# Patient Record
Sex: FEMALE | Race: WHITE | NOT HISPANIC OR LATINO | Employment: UNEMPLOYED | URBAN - METROPOLITAN AREA
[De-identification: names, ages, dates, MRNs, and addresses within clinical notes are randomized per-mention and may not be internally consistent; named-entity substitution may affect disease eponyms.]

---

## 2019-10-10 ENCOUNTER — OFFICE VISIT (OUTPATIENT)
Dept: URGENT CARE | Facility: CLINIC | Age: 54
End: 2019-10-10
Payer: MEDICARE

## 2019-10-10 VITALS
BODY MASS INDEX: 32.96 KG/M2 | SYSTOLIC BLOOD PRESSURE: 134 MMHG | DIASTOLIC BLOOD PRESSURE: 60 MMHG | HEART RATE: 90 BPM | WEIGHT: 186 LBS | TEMPERATURE: 98.4 F | HEIGHT: 63 IN | RESPIRATION RATE: 18 BRPM | OXYGEN SATURATION: 97 %

## 2019-10-10 DIAGNOSIS — R30.0 DYSURIA: ICD-10-CM

## 2019-10-10 DIAGNOSIS — N30.01 ACUTE CYSTITIS WITH HEMATURIA: ICD-10-CM

## 2019-10-10 DIAGNOSIS — N12 PYELONEPHRITIS: Primary | ICD-10-CM

## 2019-10-10 LAB
SL AMB  POCT GLUCOSE, UA: ABNORMAL
SL AMB LEUKOCYTE ESTERASE,UA: ABNORMAL
SL AMB POCT BILIRUBIN,UA: ABNORMAL
SL AMB POCT BLOOD,UA: ABNORMAL
SL AMB POCT CLARITY,UA: ABNORMAL
SL AMB POCT COLOR,UA: YELLOW
SL AMB POCT KETONES,UA: ABNORMAL
SL AMB POCT NITRITE,UA: ABNORMAL
SL AMB POCT PH,UA: 5
SL AMB POCT SPECIFIC GRAVITY,UA: 1.03
SL AMB POCT URINE PROTEIN: ABNORMAL
SL AMB POCT UROBILINOGEN: 0.2

## 2019-10-10 PROCEDURE — G0463 HOSPITAL OUTPT CLINIC VISIT: HCPCS | Performed by: FAMILY MEDICINE

## 2019-10-10 PROCEDURE — 99204 OFFICE O/P NEW MOD 45 MIN: CPT | Performed by: FAMILY MEDICINE

## 2019-10-10 PROCEDURE — 81002 URINALYSIS NONAUTO W/O SCOPE: CPT | Performed by: FAMILY MEDICINE

## 2019-10-10 PROCEDURE — 87086 URINE CULTURE/COLONY COUNT: CPT | Performed by: FAMILY MEDICINE

## 2019-10-10 RX ORDER — OXYCODONE HCL 10 MG/1
10 TABLET, FILM COATED, EXTENDED RELEASE ORAL EVERY 12 HOURS SCHEDULED
COMMUNITY
End: 2021-04-08

## 2019-10-10 RX ORDER — HYDROMORPHONE HYDROCHLORIDE 2 MG/1
2 TABLET ORAL EVERY 4 HOURS PRN
COMMUNITY
End: 2021-04-08

## 2019-10-10 RX ORDER — CIPROFLOXACIN 500 MG/1
500 TABLET, FILM COATED ORAL EVERY 12 HOURS SCHEDULED
Qty: 14 TABLET | Refills: 0 | Status: SHIPPED | OUTPATIENT
Start: 2019-10-10 | End: 2019-10-17

## 2019-10-10 NOTE — PROGRESS NOTES
NAME: Jenney Cowden is a 53 y o  female  : 1965    MRN: [id number]      Assessment and Plan   Pyelonephritis [N12]  1  Pyelonephritis     2  Acute cystitis with hematuria  POCT urine dip    ciprofloxacin (CIPRO) 500 mg tablet    Urine culture   3  Dysuria  POCT urine dip           Patient Instructions   Patient Instructions   F/u as needed  Cipro  Fluids  CX sent    Proceed to ER if symptoms worsen  Chief Complaint     Chief Complaint   Patient presents with    Possible UTI     freq  urination and burning on urination for 2 weeks         History of Present Illness   Here c/o UTI  Burning, L lower back pain, frequency, urgency,  Symptoms for about 2 wks  No fevers  chills      Review of Systems   Review of Systems   Constitutional: Negative for fatigue and fever  HENT: Negative for ear pain and trouble swallowing  Eyes: Negative for visual disturbance  Respiratory: Negative for chest tightness and shortness of breath  Cardiovascular: Negative for chest pain  Gastrointestinal: Negative for abdominal pain, diarrhea, nausea and vomiting  Genitourinary: Positive for dysuria, frequency and urgency  Negative for difficulty urinating  Musculoskeletal: Positive for back pain  Skin: Negative for rash and wound  Neurological: Negative for weakness and headaches  Psychiatric/Behavioral: Negative for behavioral problems and confusion           Current Medications       Current Outpatient Medications:     Galcanezumab-gnlm (EMGALITY) 120 MG/ML SOAJ, Inject under the skin, Disp: , Rfl:     HYDROmorphone (DILAUDID) 2 mg tablet, Take 2 mg by mouth every 4 (four) hours as needed for moderate pain, Disp: , Rfl:     oxyCODONE (OxyCONTIN) 10 mg 12 hr tablet, Take 10 mg by mouth every 12 (twelve) hours, Disp: , Rfl:     ciprofloxacin (CIPRO) 500 mg tablet, Take 1 tablet (500 mg total) by mouth every 12 (twelve) hours for 7 days, Disp: 14 tablet, Rfl: 0    Current Allergies     Allergies as of 10/10/2019 - Reviewed 10/10/2019   Allergen Reaction Noted    Demerol [meperidine] Hallucinations 10/10/2019              History reviewed  No pertinent past medical history  History reviewed  No pertinent surgical history  History reviewed  No pertinent family history  Medications have been verified  The following portions of the patient's history were reviewed and updated as appropriate: allergies, current medications, past family history, past medical history, past social history, past surgical history and problem list     Objective   /60   Pulse 90   Temp 98 4 °F (36 9 °C)   Resp 18   Ht 5' 3" (1 6 m)   Wt 84 4 kg (186 lb)   SpO2 97%   BMI 32 95 kg/m²      Physical Exam     Physical Exam   Constitutional: She is oriented to person, place, and time  She appears well-developed and well-nourished  HENT:   Head: Normocephalic  Eyes: EOM are normal    Neck: Normal range of motion  Cardiovascular: Normal rate, regular rhythm and normal heart sounds  Exam reveals no gallop and no friction rub  No murmur heard  Pulmonary/Chest: Effort normal and breath sounds normal  No respiratory distress  She has no wheezes  She has no rales  Abdominal: Soft  Bowel sounds are normal  She exhibits no distension  There is tenderness  There is no rebound  Musculoskeletal: Normal range of motion  R CVAT   Neurological: She is oriented to person, place, and time  Skin: Skin is warm and dry  No rash noted  Psychiatric: She has a normal mood and affect  Thought content normal    Nursing note and vitals reviewed

## 2019-10-11 LAB — BACTERIA UR CULT: NORMAL

## 2019-10-14 ENCOUNTER — TELEPHONE (OUTPATIENT)
Dept: URGENT CARE | Facility: CLINIC | Age: 54
End: 2019-10-14

## 2021-04-08 ENCOUNTER — LAB (OUTPATIENT)
Dept: LAB | Facility: HOSPITAL | Age: 56
End: 2021-04-08
Attending: INTERNAL MEDICINE
Payer: MEDICARE

## 2021-04-08 ENCOUNTER — OFFICE VISIT (OUTPATIENT)
Dept: FAMILY MEDICINE CLINIC | Facility: HOSPITAL | Age: 56
End: 2021-04-08
Payer: MEDICARE

## 2021-04-08 VITALS
BODY MASS INDEX: 32.5 KG/M2 | HEART RATE: 86 BPM | HEIGHT: 63 IN | OXYGEN SATURATION: 95 % | DIASTOLIC BLOOD PRESSURE: 80 MMHG | SYSTOLIC BLOOD PRESSURE: 102 MMHG | WEIGHT: 183.4 LBS

## 2021-04-08 DIAGNOSIS — Z12.31 SCREENING MAMMOGRAM, ENCOUNTER FOR: ICD-10-CM

## 2021-04-08 DIAGNOSIS — Z13.29 SCREENING FOR THYROID DISORDER: ICD-10-CM

## 2021-04-08 DIAGNOSIS — Z01.818 PRE-OP EXAMINATION: ICD-10-CM

## 2021-04-08 DIAGNOSIS — Z13.1 SCREENING FOR DIABETES MELLITUS: ICD-10-CM

## 2021-04-08 DIAGNOSIS — Z13.0 SCREENING, ANEMIA, DEFICIENCY, IRON: ICD-10-CM

## 2021-04-08 DIAGNOSIS — Z13.220 SCREENING, LIPID: ICD-10-CM

## 2021-04-08 DIAGNOSIS — F11.20 CHRONIC NARCOTIC DEPENDENCE (HCC): ICD-10-CM

## 2021-04-08 DIAGNOSIS — G90.59 COMPLEX REGIONAL PAIN SYNDROME TYPE 1 AFFECTING OTHER SITE: ICD-10-CM

## 2021-04-08 DIAGNOSIS — Z00.00 HEALTH CARE MAINTENANCE: Primary | ICD-10-CM

## 2021-04-08 PROBLEM — G90.50 COMPLEX REGIONAL PAIN SYNDROME TYPE I: Status: ACTIVE | Noted: 2021-04-08

## 2021-04-08 LAB
ALBUMIN SERPL BCP-MCNC: 3.9 G/DL (ref 3.5–5)
ALP SERPL-CCNC: 59 U/L (ref 46–116)
ALT SERPL W P-5'-P-CCNC: 38 U/L (ref 12–78)
ANION GAP SERPL CALCULATED.3IONS-SCNC: 3 MMOL/L (ref 4–13)
AST SERPL W P-5'-P-CCNC: 44 U/L (ref 5–45)
BASOPHILS # BLD AUTO: 0.01 THOUSANDS/ΜL (ref 0–0.1)
BASOPHILS NFR BLD AUTO: 0 % (ref 0–1)
BILIRUB SERPL-MCNC: 0.44 MG/DL (ref 0.2–1)
BUN SERPL-MCNC: 10 MG/DL (ref 5–25)
CALCIUM SERPL-MCNC: 9.3 MG/DL (ref 8.3–10.1)
CHLORIDE SERPL-SCNC: 109 MMOL/L (ref 100–108)
CHOLEST SERPL-MCNC: 163 MG/DL (ref 50–200)
CO2 SERPL-SCNC: 29 MMOL/L (ref 21–32)
CREAT SERPL-MCNC: 0.8 MG/DL (ref 0.6–1.3)
EOSINOPHIL # BLD AUTO: 0.22 THOUSAND/ΜL (ref 0–0.61)
EOSINOPHIL NFR BLD AUTO: 4 % (ref 0–6)
ERYTHROCYTE [DISTWIDTH] IN BLOOD BY AUTOMATED COUNT: 11.5 % (ref 11.6–15.1)
GFR SERPL CREATININE-BSD FRML MDRD: 82 ML/MIN/1.73SQ M
GLUCOSE P FAST SERPL-MCNC: 99 MG/DL (ref 65–99)
HCT VFR BLD AUTO: 41.1 % (ref 34.8–46.1)
HDLC SERPL-MCNC: 58 MG/DL
HGB BLD-MCNC: 13.9 G/DL (ref 11.5–15.4)
IMM GRANULOCYTES # BLD AUTO: 0.01 THOUSAND/UL (ref 0–0.2)
IMM GRANULOCYTES NFR BLD AUTO: 0 % (ref 0–2)
LDLC SERPL CALC-MCNC: 95 MG/DL (ref 0–100)
LYMPHOCYTES # BLD AUTO: 0.89 THOUSANDS/ΜL (ref 0.6–4.47)
LYMPHOCYTES NFR BLD AUTO: 17 % (ref 14–44)
MCH RBC QN AUTO: 31.8 PG (ref 26.8–34.3)
MCHC RBC AUTO-ENTMCNC: 33.8 G/DL (ref 31.4–37.4)
MCV RBC AUTO: 94 FL (ref 82–98)
MONOCYTES # BLD AUTO: 0.26 THOUSAND/ΜL (ref 0.17–1.22)
MONOCYTES NFR BLD AUTO: 5 % (ref 4–12)
NEUTROPHILS # BLD AUTO: 3.94 THOUSANDS/ΜL (ref 1.85–7.62)
NEUTS SEG NFR BLD AUTO: 74 % (ref 43–75)
NRBC BLD AUTO-RTO: 0 /100 WBCS
PLATELET # BLD AUTO: 216 THOUSANDS/UL (ref 149–390)
PMV BLD AUTO: 10.4 FL (ref 8.9–12.7)
POTASSIUM SERPL-SCNC: 4.2 MMOL/L (ref 3.5–5.3)
PROT SERPL-MCNC: 7.6 G/DL (ref 6.4–8.2)
RBC # BLD AUTO: 4.37 MILLION/UL (ref 3.81–5.12)
SODIUM SERPL-SCNC: 141 MMOL/L (ref 136–145)
TRIGL SERPL-MCNC: 52 MG/DL
TSH SERPL DL<=0.05 MIU/L-ACNC: 0.82 UIU/ML (ref 0.36–3.74)
WBC # BLD AUTO: 5.33 THOUSAND/UL (ref 4.31–10.16)

## 2021-04-08 PROCEDURE — 85025 COMPLETE CBC W/AUTO DIFF WBC: CPT

## 2021-04-08 PROCEDURE — 84443 ASSAY THYROID STIM HORMONE: CPT

## 2021-04-08 PROCEDURE — 80053 COMPREHEN METABOLIC PANEL: CPT

## 2021-04-08 PROCEDURE — 36415 COLL VENOUS BLD VENIPUNCTURE: CPT

## 2021-04-08 PROCEDURE — 80061 LIPID PANEL: CPT

## 2021-04-08 PROCEDURE — 99204 OFFICE O/P NEW MOD 45 MIN: CPT | Performed by: INTERNAL MEDICINE

## 2021-04-08 RX ORDER — ONDANSETRON HYDROCHLORIDE 24 MG/1
4 TABLET, FILM COATED ORAL
COMMUNITY
End: 2021-12-06 | Stop reason: ALTCHOICE

## 2021-04-08 RX ORDER — FENTANYL 100 UG/H
PATCH TRANSDERMAL
COMMUNITY
Start: 2021-04-07

## 2021-04-08 RX ORDER — FENTANYL 50 UG/H
PATCH TRANSDERMAL
COMMUNITY
Start: 2021-04-07

## 2021-04-08 RX ORDER — HYDROMORPHONE HYDROCHLORIDE 8 MG/1
TABLET ORAL
COMMUNITY
Start: 2021-04-07

## 2021-04-08 NOTE — PROGRESS NOTES
Subjective:   Chief Complaint   Patient presents with    Well Check     new patient     Pre-op Exam     4/21/2021 Dr Jocy Mckeon        Patient ID: Guillermo Dykes is a 61 y o  female  Here to establish care and is planning foot surgery 4/21 and will need clearance for this  Has chronic pain from CRPS diagnosis and is on chronic narcotic medications which are managed by her pain specialist      Has no other significant medical problems  The following portions of the patient's history were reviewed and updated as appropriate: allergies, current medications, past family history, past medical history, past social history, past surgical history and problem list     Review of Systems   Constitutional: Negative for fatigue and fever  HENT: Negative for hearing loss  Eyes: Negative for visual disturbance  Respiratory: Negative for cough, chest tightness, shortness of breath and wheezing  Cardiovascular: Negative for chest pain, palpitations and leg swelling  Gastrointestinal: Negative for abdominal pain, diarrhea and nausea  Genitourinary: Negative for dysuria and hematuria  Musculoskeletal: Positive for arthralgias, back pain and myalgias  Neurological: Negative for dizziness, numbness and headaches  Psychiatric/Behavioral: Negative for confusion and dysphoric mood  All other systems reviewed and are negative          Current Outpatient Medications on File Prior to Visit   Medication Sig Dispense Refill    fentaNYL (DURAGESIC) 100 mcg/hr TD 72 hr patch apply 1 patch to skin every 48 hours Transdermal 30 days fill on/after 4/7/21      fentaNYL (DURAGESIC) 50 mcg/hr apply 1 patch to skin every 48 hours Transdermal 30 days fill on/after 4/7/21      HYDROmorphone (DILAUDID) 8 MG tablet take 1 tablet as needed every 6 hrs Orally 30 days fill on/after 4/7/21      ondansetron (ZOFRAN) 24 MG tablet Take 4 mg by mouth      Galcanezumab-gnlm (EMGALITY) 120 MG/ML SOAJ Inject under the skin       No current facility-administered medications on file prior to visit  Objective:  Vitals:    04/08/21 1021   BP: 102/80   Pulse: 86   SpO2: 95%   Weight: 83 2 kg (183 lb 6 4 oz)   Height: 5' 3" (1 6 m)      Physical Exam  Vitals signs and nursing note reviewed  Constitutional:       General: She is not in acute distress  Neck:      Musculoskeletal: Normal range of motion  Cardiovascular:      Rate and Rhythm: Normal rate and regular rhythm  Pulmonary:      Effort: Pulmonary effort is normal       Breath sounds: Normal breath sounds  Musculoskeletal: Normal range of motion  Skin:     Findings: No rash  Neurological:      Mental Status: She is alert and oriented to person, place, and time  Psychiatric:         Thought Content: Thought content normal            Assessment/Plan:    Pre-op evaluation  EKG, Normal, sinus, no acute findings  Labs,  BS 99, CBC normal, GFR  82  Patient is cleared for the planned surgical procedure  Diagnoses and all orders for this visit:    Health care maintenance    Pre-op examination  -     POCT ECG    Complex regional pain syndrome type 1 affecting other site    Screening mammogram, encounter for  -     Mammo screening bilateral w 3d & cad; Future    Screening, anemia, deficiency, iron  -     CBC and differential; Future    Screening for thyroid disorder  -     TSH, 3rd generation; Future    Screening, lipid  -     Lipid Panel with Direct LDL reflex; Future    Screening for diabetes mellitus  -     Comprehensive metabolic panel; Future    Chronic narcotic dependence ([facility] [address] )    Other orders  -     fentaNYL (DURAGESIC) 100 mcg/hr TD 72 hr patch; apply 1 patch to skin every 48 hours Transdermal 30 days fill on/after 4/7/21  -     fentaNYL (DURAGESIC) 50 mcg/hr; apply 1 patch to skin every 48 hours Transdermal 30 days fill on/after 4/7/21  -     ondansetron (ZOFRAN) 24 MG tablet;  Take 4 mg by mouth  -     HYDROmorphone (DILAUDID) 8 MG tablet; take 1 tablet as needed every 6 hrs Orally 30 days fill on/after 4/7/21

## 2021-04-08 NOTE — PATIENT INSTRUCTIONS
Your visit today was to establish a relationship with our office and to establish care  We hope that you feel welcomed and confident that we will address your health care needs as needed  The doctor or nurse reviewed your health information with you, all medications and some family and social history  If the doctor has ordered labs or tests, please do this soon and then schedule a follow up visit as requested  Your visit today was for pre-operative clearance  The purpose is to be sure that there are no significant medical issues which would increase the risk of the surgery  The doctor will review pre-op labs tests as well as your medications and other medical history  Once the doctor is reassured that there is nothing of concern to address, she will inform the surgeon that you are cleared to have this surgery  Please be sure you follow up with any additional tests that might have been ordered, and make any medication adjustments that were discussed
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
## 2021-04-12 LAB — # OF QRS COMPLEXES: NORMAL MS

## 2021-04-12 PROCEDURE — 93000 ELECTROCARDIOGRAM COMPLETE: CPT | Performed by: INTERNAL MEDICINE

## 2021-04-22 ENCOUNTER — DOCUMENTATION (OUTPATIENT)
Dept: FAMILY MEDICINE CLINIC | Facility: HOSPITAL | Age: 56
End: 2021-04-22

## 2021-10-19 ENCOUNTER — OFFICE VISIT (OUTPATIENT)
Dept: FAMILY MEDICINE CLINIC | Facility: HOSPITAL | Age: 56
End: 2021-10-19
Payer: MEDICARE

## 2021-10-19 VITALS
WEIGHT: 180.6 LBS | HEIGHT: 63 IN | BODY MASS INDEX: 32 KG/M2 | SYSTOLIC BLOOD PRESSURE: 110 MMHG | DIASTOLIC BLOOD PRESSURE: 80 MMHG | HEART RATE: 97 BPM

## 2021-10-19 DIAGNOSIS — M79.642 CHRONIC HAND PAIN, LEFT: Primary | ICD-10-CM

## 2021-10-19 DIAGNOSIS — Z82.61 FAMILY HISTORY OF RHEUMATOID ARTHRITIS: ICD-10-CM

## 2021-10-19 DIAGNOSIS — M79.641 CHRONIC HAND PAIN, RIGHT: ICD-10-CM

## 2021-10-19 DIAGNOSIS — G89.29 CHRONIC HAND PAIN, LEFT: Primary | ICD-10-CM

## 2021-10-19 DIAGNOSIS — Z12.31 ENCOUNTER FOR SCREENING MAMMOGRAM FOR BREAST CANCER: ICD-10-CM

## 2021-10-19 DIAGNOSIS — G89.29 CHRONIC HAND PAIN, RIGHT: ICD-10-CM

## 2021-10-19 DIAGNOSIS — N39.41 URGE INCONTINENCE OF URINE: ICD-10-CM

## 2021-10-19 PROCEDURE — 99214 OFFICE O/P EST MOD 30 MIN: CPT | Performed by: STUDENT IN AN ORGANIZED HEALTH CARE EDUCATION/TRAINING PROGRAM

## 2021-10-22 ENCOUNTER — APPOINTMENT (OUTPATIENT)
Dept: LAB | Facility: HOSPITAL | Age: 56
End: 2021-10-22
Attending: STUDENT IN AN ORGANIZED HEALTH CARE EDUCATION/TRAINING PROGRAM
Payer: MEDICARE

## 2021-10-22 ENCOUNTER — HOSPITAL ENCOUNTER (OUTPATIENT)
Dept: RADIOLOGY | Facility: HOSPITAL | Age: 56
Discharge: HOME | End: 2021-10-22
Attending: STUDENT IN AN ORGANIZED HEALTH CARE EDUCATION/TRAINING PROGRAM
Payer: MEDICARE

## 2021-10-22 DIAGNOSIS — M79.642 CHRONIC HAND PAIN, LEFT: ICD-10-CM

## 2021-10-22 DIAGNOSIS — G89.29 CHRONIC HAND PAIN, RIGHT: ICD-10-CM

## 2021-10-22 DIAGNOSIS — Z82.61 FAMILY HISTORY OF RHEUMATOID ARTHRITIS: ICD-10-CM

## 2021-10-22 DIAGNOSIS — M79.641 CHRONIC HAND PAIN, RIGHT: ICD-10-CM

## 2021-10-22 DIAGNOSIS — G89.29 CHRONIC HAND PAIN, LEFT: ICD-10-CM

## 2021-10-22 DIAGNOSIS — R82.998 URINE LEUKOCYTES: Primary | ICD-10-CM

## 2021-10-22 LAB
BACTERIA UR QL AUTO: ABNORMAL /HPF
BILIRUB UR QL STRIP: NEGATIVE
CLARITY UR: ABNORMAL
COLOR UR: YELLOW
CRP SERPL QL: <3 MG/L
GLUCOSE UR STRIP-MCNC: NEGATIVE MG/DL
HGB UR QL STRIP.AUTO: ABNORMAL
HYALINE CASTS #/AREA URNS LPF: ABNORMAL /LPF
KETONES UR STRIP-MCNC: NEGATIVE MG/DL
LEUKOCYTE ESTERASE UR QL STRIP: ABNORMAL
NITRITE UR QL STRIP: POSITIVE
NON-SQ EPI CELLS URNS QL MICRO: ABNORMAL /HPF
PH UR STRIP.AUTO: 7 [PH]
PROT UR STRIP-MCNC: NEGATIVE MG/DL
RBC #/AREA URNS AUTO: ABNORMAL /HPF
SP GR UR STRIP.AUTO: 1.02 (ref 1–1.03)
UROBILINOGEN UR QL STRIP.AUTO: 0.2 E.U./DL
WBC #/AREA URNS AUTO: ABNORMAL /HPF

## 2021-10-22 PROCEDURE — 86430 RHEUMATOID FACTOR TEST QUAL: CPT

## 2021-10-22 PROCEDURE — 81001 URINALYSIS AUTO W/SCOPE: CPT | Performed by: STUDENT IN AN ORGANIZED HEALTH CARE EDUCATION/TRAINING PROGRAM

## 2021-10-22 PROCEDURE — 36415 COLL VENOUS BLD VENIPUNCTURE: CPT

## 2021-10-22 PROCEDURE — 86200 CCP ANTIBODY: CPT

## 2021-10-22 PROCEDURE — 73130 X-RAY EXAM OF HAND: CPT

## 2021-10-22 PROCEDURE — 86140 C-REACTIVE PROTEIN: CPT

## 2021-10-22 PROCEDURE — 86038 ANTINUCLEAR ANTIBODIES: CPT

## 2021-10-22 RX ORDER — SULFAMETHOXAZOLE AND TRIMETHOPRIM 800; 160 MG/1; MG/1
1 TABLET ORAL 2 TIMES DAILY
Qty: 6 TABLET | Refills: 0 | Status: SHIPPED | OUTPATIENT
Start: 2021-10-22 | End: 2021-10-25

## 2021-10-23 LAB — RHEUMATOID FACT SER QL LA: NEGATIVE

## 2021-10-25 LAB — RYE IGE QN: NEGATIVE

## 2021-10-26 LAB — CCP AB SER IA-ACNC: 2.3

## 2021-12-06 ENCOUNTER — OFFICE VISIT (OUTPATIENT)
Dept: FAMILY MEDICINE CLINIC | Facility: HOSPITAL | Age: 56
End: 2021-12-06
Payer: COMMERCIAL

## 2021-12-06 VITALS
HEIGHT: 63 IN | OXYGEN SATURATION: 99 % | SYSTOLIC BLOOD PRESSURE: 122 MMHG | DIASTOLIC BLOOD PRESSURE: 84 MMHG | RESPIRATION RATE: 16 BRPM | HEART RATE: 86 BPM | BODY MASS INDEX: 31.18 KG/M2 | WEIGHT: 176 LBS

## 2021-12-06 DIAGNOSIS — Z12.11 SCREEN FOR COLON CANCER: ICD-10-CM

## 2021-12-06 DIAGNOSIS — R25.1 SHAKINESS: ICD-10-CM

## 2021-12-06 DIAGNOSIS — Z79.899 MEDICAL MARIJUANA USE: ICD-10-CM

## 2021-12-06 DIAGNOSIS — Z79.891 CHRONIC PRESCRIPTION OPIATE USE: ICD-10-CM

## 2021-12-06 DIAGNOSIS — N39.41 URGE INCONTINENCE OF URINE: ICD-10-CM

## 2021-12-06 DIAGNOSIS — N39.0 RECURRENT UTI: ICD-10-CM

## 2021-12-06 DIAGNOSIS — Z00.00 HEALTH CARE MAINTENANCE: Primary | ICD-10-CM

## 2021-12-06 DIAGNOSIS — R82.998 URINE LEUKOCYTES: ICD-10-CM

## 2021-12-06 DIAGNOSIS — G90.59 COMPLEX REGIONAL PAIN SYNDROME TYPE 1 AFFECTING OTHER SITE: ICD-10-CM

## 2021-12-06 PROBLEM — Z01.818 PRE-OP EXAMINATION: Status: RESOLVED | Noted: 2021-04-08 | Resolved: 2021-12-06

## 2021-12-06 LAB
SL AMB  POCT GLUCOSE, UA: ABNORMAL
SL AMB LEUKOCYTE ESTERASE,UA: ABNORMAL
SL AMB POCT BILIRUBIN,UA: ABNORMAL
SL AMB POCT BLOOD,UA: ABNORMAL
SL AMB POCT CLARITY,UA: YELLOW
SL AMB POCT COLOR,UA: ABNORMAL
SL AMB POCT HEMOGLOBIN AIC: 5.4 (ref ?–6.5)
SL AMB POCT KETONES,UA: ABNORMAL
SL AMB POCT NITRITE,UA: ABNORMAL
SL AMB POCT PH,UA: 7.5
SL AMB POCT SPECIFIC GRAVITY,UA: 1.01
SL AMB POCT URINE PROTEIN: ABNORMAL
SL AMB POCT UROBILINOGEN: ABNORMAL

## 2021-12-06 PROCEDURE — 3725F SCREEN DEPRESSION PERFORMED: CPT | Performed by: STUDENT IN AN ORGANIZED HEALTH CARE EDUCATION/TRAINING PROGRAM

## 2021-12-06 PROCEDURE — G0439 PPPS, SUBSEQ VISIT: HCPCS | Performed by: STUDENT IN AN ORGANIZED HEALTH CARE EDUCATION/TRAINING PROGRAM

## 2021-12-06 PROCEDURE — 83036 HEMOGLOBIN GLYCOSYLATED A1C: CPT | Performed by: STUDENT IN AN ORGANIZED HEALTH CARE EDUCATION/TRAINING PROGRAM

## 2021-12-06 PROCEDURE — 87086 URINE CULTURE/COLONY COUNT: CPT | Performed by: STUDENT IN AN ORGANIZED HEALTH CARE EDUCATION/TRAINING PROGRAM

## 2021-12-06 PROCEDURE — 87077 CULTURE AEROBIC IDENTIFY: CPT | Performed by: STUDENT IN AN ORGANIZED HEALTH CARE EDUCATION/TRAINING PROGRAM

## 2021-12-06 PROCEDURE — 3008F BODY MASS INDEX DOCD: CPT | Performed by: STUDENT IN AN ORGANIZED HEALTH CARE EDUCATION/TRAINING PROGRAM

## 2021-12-06 PROCEDURE — 87186 SC STD MICRODIL/AGAR DIL: CPT | Performed by: STUDENT IN AN ORGANIZED HEALTH CARE EDUCATION/TRAINING PROGRAM

## 2021-12-06 PROCEDURE — 81002 URINALYSIS NONAUTO W/O SCOPE: CPT | Performed by: STUDENT IN AN ORGANIZED HEALTH CARE EDUCATION/TRAINING PROGRAM

## 2021-12-06 PROCEDURE — 1036F TOBACCO NON-USER: CPT | Performed by: STUDENT IN AN ORGANIZED HEALTH CARE EDUCATION/TRAINING PROGRAM

## 2021-12-06 RX ORDER — ONDANSETRON 8 MG/1
TABLET, ORALLY DISINTEGRATING ORAL
COMMUNITY

## 2021-12-06 RX ORDER — SULFAMETHOXAZOLE AND TRIMETHOPRIM 800; 160 MG/1; MG/1
1 TABLET ORAL 2 TIMES DAILY
Qty: 6 TABLET | Refills: 0 | Status: SHIPPED | OUTPATIENT
Start: 2021-12-06 | End: 2021-12-09

## 2021-12-08 LAB
BACTERIA UR CULT: ABNORMAL
BACTERIA UR CULT: ABNORMAL

## 2021-12-09 DIAGNOSIS — N39.0 RECURRENT UTI: Primary | ICD-10-CM

## 2021-12-09 RX ORDER — CIPROFLOXACIN 500 MG/1
500 TABLET, FILM COATED ORAL EVERY 12 HOURS SCHEDULED
Qty: 14 TABLET | Refills: 0 | Status: SHIPPED | OUTPATIENT
Start: 2021-12-09 | End: 2021-12-16

## 2021-12-10 ENCOUNTER — TELEPHONE (OUTPATIENT)
Dept: FAMILY MEDICINE CLINIC | Facility: HOSPITAL | Age: 56
End: 2021-12-10

## 2021-12-10 DIAGNOSIS — B34.9 VIRAL ILLNESS: Primary | ICD-10-CM

## 2021-12-10 PROCEDURE — U0003 INFECTIOUS AGENT DETECTION BY NUCLEIC ACID (DNA OR RNA); SEVERE ACUTE RESPIRATORY SYNDROME CORONAVIRUS 2 (SARS-COV-2) (CORONAVIRUS DISEASE [COVID-19]), AMPLIFIED PROBE TECHNIQUE, MAKING USE OF HIGH THROUGHPUT TECHNOLOGIES AS DESCRIBED BY CMS-2020-01-R: HCPCS | Performed by: STUDENT IN AN ORGANIZED HEALTH CARE EDUCATION/TRAINING PROGRAM

## 2021-12-10 PROCEDURE — U0005 INFEC AGEN DETEC AMPLI PROBE: HCPCS | Performed by: STUDENT IN AN ORGANIZED HEALTH CARE EDUCATION/TRAINING PROGRAM

## 2021-12-22 ENCOUNTER — ANNUAL EXAM (OUTPATIENT)
Dept: FAMILY MEDICINE CLINIC | Facility: HOSPITAL | Age: 56
End: 2021-12-22
Payer: COMMERCIAL

## 2021-12-22 VITALS
BODY MASS INDEX: 31 KG/M2 | DIASTOLIC BLOOD PRESSURE: 84 MMHG | HEART RATE: 75 BPM | SYSTOLIC BLOOD PRESSURE: 118 MMHG | OXYGEN SATURATION: 96 % | WEIGHT: 175 LBS

## 2021-12-22 DIAGNOSIS — Z12.4 CERVICAL CANCER SCREENING: Primary | ICD-10-CM

## 2021-12-22 DIAGNOSIS — Z11.51 SCREENING FOR HPV (HUMAN PAPILLOMAVIRUS): ICD-10-CM

## 2021-12-22 PROCEDURE — G0101 CA SCREEN;PELVIC/BREAST EXAM: HCPCS | Performed by: STUDENT IN AN ORGANIZED HEALTH CARE EDUCATION/TRAINING PROGRAM

## 2021-12-22 PROCEDURE — G0476 HPV COMBO ASSAY CA SCREEN: HCPCS | Performed by: STUDENT IN AN ORGANIZED HEALTH CARE EDUCATION/TRAINING PROGRAM

## 2021-12-22 PROCEDURE — G0145 SCR C/V CYTO,THINLAYER,RESCR: HCPCS | Performed by: STUDENT IN AN ORGANIZED HEALTH CARE EDUCATION/TRAINING PROGRAM

## 2021-12-22 RX ORDER — NALOXEGOL OXALATE 25 MG/1
TABLET, FILM COATED ORAL
COMMUNITY
Start: 2021-12-15

## 2021-12-24 LAB
HPV HR 12 DNA CVX QL NAA+PROBE: NEGATIVE
HPV16 DNA CVX QL NAA+PROBE: NEGATIVE
HPV18 DNA CVX QL NAA+PROBE: NEGATIVE

## 2022-01-06 LAB
LAB AP GYN PRIMARY INTERPRETATION: NORMAL
Lab: NORMAL

## 2022-02-13 ENCOUNTER — HOSPITAL ENCOUNTER (OUTPATIENT)
Dept: MRI IMAGING | Facility: HOSPITAL | Age: 57
Discharge: HOME | End: 2022-02-13
Payer: COMMERCIAL

## 2022-02-13 DIAGNOSIS — M54.16 LUMBAR RADICULOPATHY: ICD-10-CM

## 2022-02-13 PROCEDURE — 72148 MRI LUMBAR SPINE W/O DYE: CPT

## 2022-02-13 PROCEDURE — G1004 CDSM NDSC: HCPCS

## 2022-10-12 PROBLEM — Z00.00 HEALTH CARE MAINTENANCE: Status: RESOLVED | Noted: 2021-04-08 | Resolved: 2022-10-12

## 2023-08-18 ENCOUNTER — OFFICE VISIT (OUTPATIENT)
Dept: URGENT CARE | Facility: CLINIC | Age: 58
End: 2023-08-18
Payer: COMMERCIAL

## 2023-08-18 VITALS
HEART RATE: 74 BPM | DIASTOLIC BLOOD PRESSURE: 64 MMHG | OXYGEN SATURATION: 96 % | SYSTOLIC BLOOD PRESSURE: 126 MMHG | RESPIRATION RATE: 18 BRPM | TEMPERATURE: 98.2 F
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
## 2023-08-18 DIAGNOSIS — N30.01 ACUTE CYSTITIS WITH HEMATURIA: Primary | ICD-10-CM

## 2023-08-18 LAB
SL AMB  POCT GLUCOSE, UA: ABNORMAL
SL AMB LEUKOCYTE ESTERASE,UA: POSITIVE
SL AMB POCT BILIRUBIN,UA: ABNORMAL
SL AMB POCT BLOOD,UA: POSITIVE
SL AMB POCT CLARITY,UA: ABNORMAL
SL AMB POCT COLOR,UA: YELLOW
SL AMB POCT KETONES,UA: ABNORMAL
SL AMB POCT NITRITE,UA: POSITIVE
SL AMB POCT PH,UA: 7
SL AMB POCT SPECIFIC GRAVITY,UA: 1.01
SL AMB POCT URINE PROTEIN: ABNORMAL
SL AMB POCT UROBILINOGEN: ABNORMAL

## 2023-08-18 PROCEDURE — 99213 OFFICE O/P EST LOW 20 MIN: CPT | Performed by: NURSE PRACTITIONER

## 2023-08-18 PROCEDURE — 87077 CULTURE AEROBIC IDENTIFY: CPT | Performed by: NURSE PRACTITIONER

## 2023-08-18 PROCEDURE — 81002 URINALYSIS NONAUTO W/O SCOPE: CPT | Performed by: NURSE PRACTITIONER

## 2023-08-18 PROCEDURE — S9083 URGENT CARE CENTER GLOBAL: HCPCS | Performed by: NURSE PRACTITIONER

## 2023-08-18 PROCEDURE — 87186 SC STD MICRODIL/AGAR DIL: CPT | Performed by: NURSE PRACTITIONER

## 2023-08-18 PROCEDURE — 87086 URINE CULTURE/COLONY COUNT: CPT | Performed by: NURSE PRACTITIONER

## 2023-08-18 RX ORDER — SULFAMETHOXAZOLE AND TRIMETHOPRIM 800; 160 MG/1; MG/1
1 TABLET ORAL 2 TIMES DAILY
Qty: 6 TABLET | Refills: 0 | Status: SHIPPED | OUTPATIENT
Start: 2023-08-18 | End: 2023-08-21

## 2023-08-18 RX ORDER — HYDROXYZINE HYDROCHLORIDE 25 MG/1
25 TABLET, FILM COATED ORAL EVERY 6 HOURS PRN
Qty: 15 TABLET | Refills: 0 | Status: SHIPPED | OUTPATIENT
Start: 2023-08-18 | End: 2023-09-02

## 2023-08-18 NOTE — PROGRESS NOTES
[facility]        NAME: Radha Odonnell is a 60 y.o. female  : 1965    MRN: [id number]  DATE: 2023  TIME: 7:42 PM    Assessment and Plan   Acute cystitis with hematuria [N30.01]  1. Acute cystitis with hematuria  sulfamethoxazole-trimethoprim (BACTRIM DS) 800-160 mg per tablet    POCT urine dip    Urine culture      2. Bug bite, initial encounter  hydrOXYzine HCL (ATARAX) 25 mg tablet        Acute symptomatic POCT urine dip showed positive for leuks hematuria and nitrates. Will recommend increase fluids and start Bactrim twice daily x3 days. We will send urine culture however it may not be enough urine. Educated patient if urine culture can be run and symptoms persist should follow-up with urgent care primary care or ED. For bug bite will recommend hydroxyzine every 6 hours as needed for itching and swelling. Educated on side effects proper use of medication. Patient verbalized understanding and will follow-up with any worsening of symptoms    Patient Instructions       Follow up with PCP in 3-5 days. Proceed to  ER if symptoms worsen. Chief Complaint     Chief Complaint   Patient presents with   • Possible UTI     Patient has frequent urination, burning, and odor for about a week or more. • Rash     Patient was bit on the right side of her face by a bug of some sort about 90 minutes ago, has itching and edema         History of Present Illness       Patient is a 28-year-old female arrives with complaints of dysuria urine frequent urination and odor with urination x1 month. Denies nausea vomiting lower abdominal pain. Does report slight low back pain. Denies past medical history of kidney stones kidney infection. Also reports bug bite happened about 90 minutes ago to the right side of the face now started with some swelling.   No signs or symptoms of infection noted no shortness of breath chest pain fever      Review of Systems   Review of Systems   Constitutional: Negative for activity change, appetite change, chills, fatigue and fever. HENT: Negative for congestion, postnasal drip, rhinorrhea, sneezing and sore throat. Respiratory: Negative for cough, chest tightness, shortness of breath and wheezing. Cardiovascular: Negative for chest pain and palpitations. Gastrointestinal: Negative for abdominal pain, constipation, diarrhea, nausea and vomiting. Genitourinary: Positive for dysuria, frequency and hematuria. Negative for flank pain. Musculoskeletal: Negative for arthralgias and myalgias. Skin: Positive for rash. Negative for color change and pallor. Neurological: Negative for dizziness, weakness, light-headedness and headaches. Hematological: Negative for adenopathy. Psychiatric/Behavioral: Negative for agitation and confusion.          Current Medications       Current Outpatient Medications:   •  hydrOXYzine HCL (ATARAX) 25 mg tablet, Take 1 tablet (25 mg total) by mouth every 6 (six) hours as needed for itching for up to 15 days, Disp: 15 tablet, Rfl: 0  •  sulfamethoxazole-trimethoprim (BACTRIM DS) 800-160 mg per tablet, Take 1 tablet by mouth 2 (two) times a day for 3 days, Disp: 6 tablet, Rfl: 0  •  fentaNYL (DURAGESIC) 100 mcg/hr TD 72 hr patch, apply 1 patch to skin every 48 hours Transdermal 30 days fill on/after 4/7/21, Disp: , Rfl:   •  fentaNYL (DURAGESIC) 50 mcg/hr, apply 1 patch to skin every 48 hours Transdermal 30 days fill on/after 4/7/21, Disp: , Rfl:   •  HYDROmorphone (DILAUDID) 8 MG tablet, take 1 tablet as needed every 6 hrs Orally 30 days fill on/after 4/7/21, Disp: , Rfl:   •  Movantik 25 MG tablet, , Disp: , Rfl:   •  ondansetron (ZOFRAN-ODT) 8 mg disintegrating tablet, 1 every 8 hours as needed, Disp: , Rfl:     Current Allergies     Allergies as of 08/18/2023 - Reviewed 08/18/2023   Allergen Reaction Noted   • Demerol [meperidine] Hallucinations 10/10/2019   • Latex Rash 03/08/2016            The following portions of the patient's history were reviewed and updated as appropriate: allergies, current medications, past family history, past medical history, past social history, past surgical history and problem list.     Past Medical History:   Diagnosis Date   • CRPS (complex regional pain syndrome type II)        Past Surgical History:   Procedure Laterality Date   • CERVICAL FUSION      level 5 6   • FOOT SURGERY     • HERNIA REPAIR         Family History   Problem Relation Age of Onset   • Lung cancer Mother    • Stroke Father    • Mental illness Father    • Kidney cancer Sister    • Mental illness Sister    • Alcohol abuse Brother    • Rheum arthritis Maternal Grandmother    • Breast cancer Sister    • Cirrhosis Sister          Medications have been verified. Objective   /64   Pulse 74   Temp 98.2 °F (36.8 °C)   Resp 18   SpO2 96%   No LMP recorded. Physical Exam     Physical Exam  Vitals and nursing note reviewed. Constitutional:       General: She is not in acute distress. Appearance: Normal appearance. She is not ill-appearing or diaphoretic. HENT:      Head: Normocephalic and atraumatic. Nose: No congestion or rhinorrhea. Eyes:      General: No scleral icterus. Right eye: No discharge. Left eye: No discharge. Cardiovascular:      Rate and Rhythm: Normal rate and regular rhythm. Pulmonary:      Effort: Pulmonary effort is normal. No respiratory distress. Breath sounds: Normal breath sounds. Abdominal:      Tenderness: There is no abdominal tenderness. There is no right CVA tenderness or left CVA tenderness. Musculoskeletal:         General: Normal range of motion. Cervical back: Normal range of motion. Skin:     Coloration: Skin is not jaundiced or pale. Findings: Rash present. No bruising or erythema. Neurological:      General: No focal deficit present. Mental Status: She is alert and oriented to person, place, and time.    Psychiatric: Mood and Affect: Mood normal.         Behavior: Behavior normal.         Thought Content:  Thought content normal.         Judgment: Judgment normal.

## 2023-08-20 LAB — BACTERIA UR CULT: ABNORMAL

## 2023-08-22 ENCOUNTER — TELEPHONE (OUTPATIENT)
Dept: URGENT CARE | Facility: CLINIC | Age: 58
End: 2023-08-22

## 2023-08-22 DIAGNOSIS — R31.9 URINARY TRACT INFECTION WITH HEMATURIA, SITE UNSPECIFIED: Primary | ICD-10-CM

## 2023-08-22 DIAGNOSIS — N39.0 URINARY TRACT INFECTION WITH HEMATURIA, SITE UNSPECIFIED: Primary | ICD-10-CM

## 2023-08-22 RX ORDER — CEPHALEXIN 500 MG/1
500 CAPSULE ORAL EVERY 12 HOURS SCHEDULED
Qty: 14 CAPSULE | Refills: 0 | Status: SHIPPED | OUTPATIENT
Start: 2023-08-22 | End: 2023-08-29

## 2023-08-22 NOTE — TELEPHONE ENCOUNTER
Spoke with patient and educated on uti results and will recommend stop bactrim and start keflex twice daily x 7 days. Educated with worsening of symptoms/no improvement f/u with pcp, patient verbalized understanding.

## 2023-09-13 ENCOUNTER — HOSPITAL ENCOUNTER (OUTPATIENT)
Dept: MRI IMAGING | Facility: HOSPITAL | Age: 58
Discharge: HOME | End: 2023-09-13
Payer: COMMERCIAL

## 2023-09-13 DIAGNOSIS — M54.16 RADICULOPATHY, LUMBAR REGION: ICD-10-CM

## 2023-09-13 PROCEDURE — 72148 MRI LUMBAR SPINE W/O DYE: CPT

## 2024-01-10 ENCOUNTER — TELEPHONE (OUTPATIENT)
Dept: FAMILY MEDICINE CLINIC | Facility: HOSPITAL | Age: 59
End: 2024-01-10

## 2024-01-17 NOTE — TELEPHONE ENCOUNTER
Left 2nd message for patient to call to schedule annual wellness visit or update with new provider info if she has changed doctors.

## 2024-02-06 ENCOUNTER — VBI (OUTPATIENT)
Dept: ADMINISTRATIVE | Facility: OTHER | Age: 59
End: 2024-02-06

## 2024-02-06 NOTE — TELEPHONE ENCOUNTER
02/06/24 11:04 AM     [employer] SmartForm used to document caregap status.    Chelo Luz   
Statement Selected

## 2024-03-06 ENCOUNTER — RA CDI HCC (OUTPATIENT)
Dept: OTHER | Facility: HOSPITAL | Age: 59
End: 2024-03-06

## 2024-03-13 ENCOUNTER — OFFICE VISIT (OUTPATIENT)
Dept: FAMILY MEDICINE CLINIC | Facility: HOSPITAL | Age: 59
End: 2024-03-13
Payer: COMMERCIAL

## 2024-03-13 VITALS
SYSTOLIC BLOOD PRESSURE: 120 MMHG | OXYGEN SATURATION: 96 % | BODY MASS INDEX: 30.48 KG/M2 | WEIGHT: 172 LBS | HEART RATE: 84 BPM | DIASTOLIC BLOOD PRESSURE: 84 MMHG | HEIGHT: 63 IN

## 2024-03-13 DIAGNOSIS — Z13.220 SCREENING FOR HYPERLIPIDEMIA: ICD-10-CM

## 2024-03-13 DIAGNOSIS — F11.20 CHRONIC NARCOTIC DEPENDENCE (HCC): ICD-10-CM

## 2024-03-13 DIAGNOSIS — Z11.59 NEED FOR HEPATITIS C SCREENING TEST: ICD-10-CM

## 2024-03-13 DIAGNOSIS — G90.512 COMPLEX REGIONAL PAIN SYNDROME TYPE 1 OF LEFT UPPER EXTREMITY: ICD-10-CM

## 2024-03-13 DIAGNOSIS — Z13.1 SCREENING FOR DIABETES MELLITUS: ICD-10-CM

## 2024-03-13 DIAGNOSIS — Z12.11 SCREENING FOR COLON CANCER: ICD-10-CM

## 2024-03-13 DIAGNOSIS — N39.0 FREQUENT UTI: ICD-10-CM

## 2024-03-13 DIAGNOSIS — N39.46 MIXED INCONTINENCE URGE AND STRESS: ICD-10-CM

## 2024-03-13 DIAGNOSIS — Z13.29 SCREENING FOR THYROID DISORDER: ICD-10-CM

## 2024-03-13 DIAGNOSIS — N39.0 URINARY TRACT INFECTION WITHOUT HEMATURIA, SITE UNSPECIFIED: Primary | ICD-10-CM

## 2024-03-13 DIAGNOSIS — F33.1 MAJOR DEPRESSIVE DISORDER, RECURRENT, MODERATE (HCC): ICD-10-CM

## 2024-03-13 DIAGNOSIS — Z12.31 ENCOUNTER FOR SCREENING MAMMOGRAM FOR BREAST CANCER: ICD-10-CM

## 2024-03-13 DIAGNOSIS — N81.10 ACQUIRED FEMALE BLADDER PROLAPSE: ICD-10-CM

## 2024-03-13 DIAGNOSIS — T85.192S FAILURE OF SPINAL CORD STIMULATOR, SEQUELA: ICD-10-CM

## 2024-03-13 DIAGNOSIS — Z13.0 SCREENING FOR IRON DEFICIENCY ANEMIA: ICD-10-CM

## 2024-03-13 DIAGNOSIS — M43.22 CERVICAL VERTEBRAL FUSION: ICD-10-CM

## 2024-03-13 PROBLEM — T85.192A FAILURE OF SPINAL CORD STIMULATOR (HCC): Status: ACTIVE | Noted: 2024-03-13

## 2024-03-13 LAB
SL AMB  POCT GLUCOSE, UA: ABNORMAL
SL AMB LEUKOCYTE ESTERASE,UA: ABNORMAL
SL AMB POCT BILIRUBIN,UA: ABNORMAL
SL AMB POCT BLOOD,UA: 3
SL AMB POCT CLARITY,UA: CLEAR
SL AMB POCT COLOR,UA: YELLOW
SL AMB POCT KETONES,UA: ABNORMAL
SL AMB POCT NITRITE,UA: ABNORMAL
SL AMB POCT PH,UA: 5.5
SL AMB POCT SPECIFIC GRAVITY,UA: 1.02
SL AMB POCT URINE PROTEIN: ABNORMAL
SL AMB POCT UROBILINOGEN: ABNORMAL

## 2024-03-13 PROCEDURE — 87086 URINE CULTURE/COLONY COUNT: CPT | Performed by: STUDENT IN AN ORGANIZED HEALTH CARE EDUCATION/TRAINING PROGRAM

## 2024-03-13 PROCEDURE — 87186 SC STD MICRODIL/AGAR DIL: CPT | Performed by: STUDENT IN AN ORGANIZED HEALTH CARE EDUCATION/TRAINING PROGRAM

## 2024-03-13 PROCEDURE — 87077 CULTURE AEROBIC IDENTIFY: CPT | Performed by: STUDENT IN AN ORGANIZED HEALTH CARE EDUCATION/TRAINING PROGRAM

## 2024-03-13 PROCEDURE — G0444 DEPRESSION SCREEN ANNUAL: HCPCS | Performed by: STUDENT IN AN ORGANIZED HEALTH CARE EDUCATION/TRAINING PROGRAM

## 2024-03-13 PROCEDURE — 99214 OFFICE O/P EST MOD 30 MIN: CPT | Performed by: STUDENT IN AN ORGANIZED HEALTH CARE EDUCATION/TRAINING PROGRAM

## 2024-03-13 PROCEDURE — G0439 PPPS, SUBSEQ VISIT: HCPCS | Performed by: STUDENT IN AN ORGANIZED HEALTH CARE EDUCATION/TRAINING PROGRAM

## 2024-03-13 PROCEDURE — 81003 URINALYSIS AUTO W/O SCOPE: CPT | Performed by: STUDENT IN AN ORGANIZED HEALTH CARE EDUCATION/TRAINING PROGRAM

## 2024-03-13 RX ORDER — OXYBUTYNIN CHLORIDE 5 MG/1
5 TABLET, EXTENDED RELEASE ORAL DAILY
Qty: 30 TABLET | Refills: 0 | Status: SHIPPED | OUTPATIENT
Start: 2024-03-13 | End: 2024-04-08

## 2024-03-13 RX ORDER — MORPHINE SULFATE 60 MG/1
60 TABLET, FILM COATED, EXTENDED RELEASE ORAL 3 TIMES DAILY
COMMUNITY
Start: 2024-03-12

## 2024-03-13 NOTE — PROGRESS NOTES
Assessment and Plan:     Problem List Items Addressed This Visit        Nervous and Auditory    Complex regional pain syndrome type I    Relevant Orders    US breast screening bilateral complete (ABUS)       Musculoskeletal and Integument    Cervical vertebral fusion       Behavioral Health    Major depressive disorder, recurrent, moderate (HCC)    Chronic narcotic dependence (HCC)       Other    Mixed incontinence urge and stress    Failure of spinal cord stimulator (HCC)   Other Visit Diagnoses     Urinary tract infection without hematuria, site unspecified    -  Primary    Relevant Orders    POCT urine dip auto non-scope (Completed)    Urine culture (Completed)    Screening for colon cancer        Relevant Orders    Cologuard    Acquired female bladder prolapse        Relevant Orders    Ambulatory referral to Urogynecology    Frequent UTI        Relevant Orders    Ambulatory referral to Urogynecology    Screening for diabetes mellitus        Relevant Orders    Comprehensive metabolic panel    Screening for hyperlipidemia        Relevant Orders    Lipid Panel with Direct LDL reflex    Screening for iron deficiency anemia        Relevant Orders    CBC and differential    Screening for thyroid disorder        Relevant Orders    TSH, 3rd generation with Free T4 reflex    Encounter for screening mammogram for breast cancer        Relevant Orders    US breast screening bilateral complete (ABUS)    Need for hepatitis C screening test        Relevant Orders    Hepatitis C antibody        Due for labs ordered above.   Trial low dose bladder med.   Ref to urogyne for pessary vs meds vs surgical options for prolapse.     ABUS for breast cancer screen given port on Left chest & CRPS, Left side body severe pain.   Vaccines UTD. Did have shingles, don't have records.   PAP UTD.     Return in about 4 months (around 7/13/2024) for F/U Chronic Conditions, review labs .       Preventive health issues were discussed with patient,  and age appropriate screening tests were ordered as noted in patient's After Visit Summary.  Personalized health advice and appropriate referrals for health education or preventive services given if needed, as noted in patient's After Visit Summary.     History of Present Illness:     Patient presents for a Medicare Wellness Visit    Not here recently  - has been at  for UTI back in Aug.   - Has bladder prolapse    Urine now seems cloudy, and has an odor.   Some slight burning.   Has been a few weeks.   Not using anything otc.   3 vaginal deliveries, large babies. First at age 15, had lacerations & repairs.     Wt Readings from Last 3 Encounters:   03/13/24 78 kg (172 lb)   12/22/21 79.4 kg (175 lb)   12/06/21 79.8 kg (176 lb)     Temp Readings from Last 3 Encounters:   08/18/23 98.2 °F (36.8 °C)   10/10/19 98.4 °F (36.9 °C)     BP Readings from Last 3 Encounters:   03/13/24 120/84   08/18/23 126/64   12/22/21 118/84     Pulse Readings from Last 3 Encounters:   03/13/24 84   08/18/23 74   12/22/21 75     Patient Care Team:  Tootie Garcia DO as PCP - General (Family Medicine)  Oscar Villavicencio DO (Anesthesiology)     Review of Systems:     Review of Systems   Constitutional:  Negative for chills and fever.   Respiratory:  Negative for cough and shortness of breath.    Cardiovascular:  Negative for chest pain and palpitations.   Gastrointestinal:  Negative for constipation and diarrhea.   Genitourinary:  Positive for dysuria, flank pain (lower back at times), frequency and urgency. Negative for hematuria.   Neurological:  Negative for dizziness, light-headedness and headaches.      Problem List:     Patient Active Problem List   Diagnosis   • Complex regional pain syndrome type I   • Abnormal mammogram   • Breast calcifications on mammogram   • Chronic prescription opiate use   • Medical marijuana use   • Chronic narcotic dependence (HCC)   • Major depressive disorder, recurrent, moderate (HCC)   • Mixed  incontinence urge and stress   • Cervical vertebral fusion   • Failure of spinal cord stimulator (HCC)      Past Medical and Surgical History:     Past Medical History:   Diagnosis Date   • CRPS (complex regional pain syndrome type II)      Past Surgical History:   Procedure Laterality Date   • CERVICAL FUSION      level 5 6   • FOOT SURGERY     • HERNIA REPAIR        Family History:     Family History   Problem Relation Age of Onset   • Lung cancer Mother    • Stroke Father    • Mental illness Father    • Kidney cancer Sister    • Mental illness Sister    • Alcohol abuse Brother    • Rheum arthritis Maternal Grandmother    • Breast cancer Sister    • Cirrhosis Sister       Social History:     Social History     Socioeconomic History   • Marital status: /Civil Union     Spouse name: None   • Number of children: None   • Years of education: None   • Highest education level: None   Occupational History   • Occupation: disability   Tobacco Use   • Smoking status: Never   • Smokeless tobacco: Never   Vaping Use   • Vaping status: Never Used   Substance and Sexual Activity   • Alcohol use: Never   • Drug use: Never   • Sexual activity: None   Other Topics Concern   • None   Social History Narrative    Wears seatbelt    No regular dental care    Walks for exercise      Social Determinants of Health     Financial Resource Strain: Not on file   Food Insecurity: No Food Insecurity (3/13/2024)    Hunger Vital Sign    • Worried About Running Out of Food in the Last Year: Never true    • Ran Out of Food in the Last Year: Never true   Transportation Needs: No Transportation Needs (3/13/2024)    PRAPARE - Transportation    • Lack of Transportation (Medical): No    • Lack of Transportation (Non-Medical): No   Physical Activity: Not on file   Stress: Not on file   Social Connections: Not on file   Intimate Partner Violence: Not on file   Housing Stability: Low Risk  (3/13/2024)    Housing Stability Vital Sign    • Unable to  Pay for Housing in the Last Year: No    • Number of Places Lived in the Last Year: 1    • Unstable Housing in the Last Year: No      Medications and Allergies:     Current Outpatient Medications   Medication Sig Dispense Refill   • HYDROmorphone (DILAUDID) 8 MG tablet take 1 tablet as needed every 6 hrs Orally 30 days fill on/after 4/7/21     • morphine (MS CONTIN) 60 mg 12 hr tablet Take 60 mg by mouth 3 (three) times a day     • Movantik 25 MG tablet      • ondansetron (ZOFRAN-ODT) 8 mg disintegrating tablet 1 every 8 hours as needed     • oxybutynin (DITROPAN-XL) 5 mg 24 hr tablet Take 1 tablet (5 mg total) by mouth daily 30 tablet 5     No current facility-administered medications for this visit.     Allergies   Allergen Reactions   • Demerol [Meperidine] Hallucinations   • Latex Rash      Immunizations:     Immunization History   Administered Date(s) Administered   • COVID-19 MODERNA VACC 0.5 ML IM 03/08/2021, 04/05/2021, 11/24/2021   • COVID-19 Moderna mRNA Vaccine 12 Yr+ 50 mcg/0.5 mL (Spikevax) 10/10/2023   • INFLUENZA 01/17/2018, 11/05/2018, 12/01/2021, 10/10/2023   • Tdap 01/17/2018   • Tuberculin Skin Test-PPD Intradermal 01/17/2013, 08/24/2015      Health Maintenance:         Topic Date Due   • Hepatitis C Screening  Never done   • HIV Screening  Never done   • Colorectal Cancer Screening  Never done   • Breast Cancer Screening: Mammogram  03/13/2025 (Originally 2/3/2004)   • Cervical Cancer Screening  12/22/2026         Topic Date Due   • COVID-19 Vaccine (5 - 2023-24 season) 12/05/2023      Medicare Screening Tests and Risk Assessments:     Emelyn is here for her Subsequent Wellness visit.     Health Risk Assessment:   Patient rates overall health as good. Patient feels that their physical health rating is same. Patient is satisfied with their life. Eyesight was rated as slightly worse. Hearing was rated as same. Patient feels that their emotional and mental health rating is same. Patients states they  are never, rarely angry. Patient states they are sometimes unusually tired/fatigued. Pain experienced in the last 7 days has been a lot. Patient's pain rating has been 5/10. Patient states that she has experienced no weight loss or gain in last 6 months.     Depression Screening:   PHQ-2 Score: 0      Fall Risk Screening:   In the past year, patient has experienced: history of falling in past year    Number of falls: 1  Injured during fall?: No    Feels unsteady when standing or walking?: Yes    Worried about falling?: No      Urinary Incontinence Screening:   Patient has leaked urine accidently in the last six months.     Home Safety:  Patient does not have trouble with stairs inside or outside of their home. Patient has working smoke alarms and has working carbon monoxide detector. Home safety hazards include: none.     Nutrition:   Current diet is Regular.     Medications:   Patient is currently taking over-the-counter supplements. OTC medications include: see medication list. Patient is able to manage medications.     Activities of Daily Living (ADLs)/Instrumental Activities of Daily Living (IADLs):   Walk and transfer into and out of bed and chair?: Yes  Dress and groom yourself?: Yes    Bathe or shower yourself?: Yes    Feed yourself? Yes  Do your laundry/housekeeping?: Yes  Manage your money, pay your bills and track your expenses?: Yes  Make your own meals?: Yes    Do your own shopping?: Yes    Previous Hospitalizations:   Any hospitalizations or ED visits within the last 12 months?: No      Advance Care Planning:   Living will: Yes    Durable POA for healthcare: Yes    Advanced directive: Yes      Cognitive Screening:   Provider or family/friend/caregiver concerned regarding cognition?: No    PREVENTIVE SCREENINGS      Cardiovascular Screening:    General: Screening Current    Due for: Lipid Panel      Diabetes Screening:       Due for: Blood Glucose      Breast Cancer Screening:       Due for: Mammogram   "      Cervical Cancer Screening:    General: Screening Current      Abdominal Aortic Aneurysm (AAA) Screening:        General: Screening Not Indicated      Lung Cancer Screening:     General: Screening Not Indicated      Hepatitis C Screening:    General: Risks and Benefits Discussed    Hep C Screening Accepted: Yes      Screening, Brief Intervention, and Referral to Treatment (SBIRT)    Screening      AUDIT-C Screenin) How often did you have a drink containing alcohol in the past year? never  2) How many drinks did you have on a typical day when you were drinking in the past year? 0  3) How often did you have 6 or more drinks on one occasion in the past year? never    AUDIT-C Score: 0  Interpretation: Score 0-2 (female): Negative screen for alcohol misuse    Single Item Drug Screening:  How often have you used an illegal drug (including marijuana) or a prescription medication for non-medical reasons in the past year? never    Single Item Drug Screen Score: 0  Interpretation: Negative screen for possible drug use disorder    Annual Depression Screening  Time spent screening and evaluating the patient for depression during today's encounter was 5 minutes.    Review of Current Opioid Use    Opioid Risk Tool (ORT) Interpretation: Complete Opioid Risk Tool (ORT)    [place] PDMP or [place]  reviewed. No red flags were identified    Other Counseling Topics:   Car/seat belt/driving safety, skin self-exam, sunscreen and calcium and vitamin D intake and regular weightbearing exercise.      Physical Exam:     /84   Pulse 84   Ht 5' 3\" (1.6 m)   Wt 78 kg (172 lb)   SpO2 96%   BMI 30.47 kg/m²     Physical Exam  Vitals reviewed.   Constitutional:       General: She is not in acute distress.     Appearance: Normal appearance. She is not ill-appearing.      Comments: Overweight   HENT:      Head: Normocephalic and atraumatic.      Right Ear: Tympanic membrane, ear canal and external ear normal. There is no impacted " cerumen.      Left Ear: Tympanic membrane, ear canal and external ear normal. There is no impacted cerumen.      Nose: Nose normal. No congestion or rhinorrhea.      Mouth/Throat:      Mouth: Mucous membranes are moist.      Pharynx: Oropharynx is clear. No oropharyngeal exudate or posterior oropharyngeal erythema.   Eyes:      General: No scleral icterus.        Right eye: No discharge.         Left eye: No discharge.      Conjunctiva/sclera: Conjunctivae normal.   Neck:      Comments: No thyroid nodules or thyromegaly.  Scar from anterior cervical fusion  Cardiovascular:      Rate and Rhythm: Normal rate and regular rhythm.      Pulses: Normal pulses.      Heart sounds: Normal heart sounds. No murmur heard.     No friction rub. No gallop.   Pulmonary:      Effort: Pulmonary effort is normal. No respiratory distress.      Breath sounds: Normal breath sounds. No stridor. No wheezing.   Musculoskeletal:         General: Normal range of motion.      Cervical back: Normal range of motion and neck supple. No rigidity or tenderness.      Right lower leg: No edema.      Left lower leg: No edema.   Lymphadenopathy:      Cervical: No cervical adenopathy.   Skin:     General: Skin is warm and dry.      Capillary Refill: Capillary refill takes less than 2 seconds.      Coloration: Skin is not jaundiced or pale.   Neurological:      Mental Status: She is alert and oriented to person, place, and time.      Gait: Gait normal.   Psychiatric:         Mood and Affect: Mood normal.         Behavior: Behavior normal.         Thought Content: Thought content normal.         Judgment: Judgment normal.        Tootie Garcia DO

## 2024-03-15 ENCOUNTER — TELEPHONE (OUTPATIENT)
Dept: FAMILY MEDICINE CLINIC | Facility: HOSPITAL | Age: 59
End: 2024-03-15

## 2024-03-15 DIAGNOSIS — N39.0 URINARY TRACT INFECTION WITHOUT HEMATURIA, SITE UNSPECIFIED: Primary | ICD-10-CM

## 2024-03-15 LAB — BACTERIA UR CULT: ABNORMAL

## 2024-03-15 RX ORDER — CEPHALEXIN 500 MG/1
500 CAPSULE ORAL 2 TIMES DAILY
Qty: 14 CAPSULE | Refills: 0 | Status: SHIPPED | OUTPATIENT
Start: 2024-03-15 | End: 2024-03-22

## 2024-03-15 NOTE — TELEPHONE ENCOUNTER
Patient called about the results from her UA on Wed 3/13.    Results are in her MYC as abnormal but the Dr has not reviewed it yet.     She would like ABX called in asap so she can start taking them.     She doesn't want to go the weekend with nothing.

## 2024-04-08 DIAGNOSIS — N81.10 ACQUIRED FEMALE BLADDER PROLAPSE: ICD-10-CM

## 2024-04-08 DIAGNOSIS — N39.0 FREQUENT UTI: ICD-10-CM

## 2024-04-08 RX ORDER — OXYBUTYNIN CHLORIDE 5 MG/1
5 TABLET, EXTENDED RELEASE ORAL DAILY
Qty: 30 TABLET | Refills: 5 | Status: SHIPPED | OUTPATIENT
Start: 2024-04-08

## 2024-06-20 DIAGNOSIS — N39.0 FREQUENT UTI: Primary | ICD-10-CM

## 2024-06-20 RX ORDER — CEPHALEXIN 500 MG/1
500 CAPSULE ORAL 2 TIMES DAILY
Qty: 14 CAPSULE | Refills: 0 | Status: SHIPPED | OUTPATIENT
Start: 2024-06-20 | End: 2024-06-27

## 2024-07-23 ENCOUNTER — TELEPHONE (OUTPATIENT)
Dept: ADMINISTRATIVE | Facility: OTHER | Age: 59
End: 2024-07-23

## 2024-07-23 NOTE — TELEPHONE ENCOUNTER
07/23/24 12:44 PM    Patient contacted to bring Advance Directive, POLST, or Living Will document to next scheduled pcp visit.[facility] Department spoke with patient/ caregiver.    Thank you.  Gilma Toussaint  [facility]

## 2024-07-24 ENCOUNTER — OFFICE VISIT (OUTPATIENT)
Dept: FAMILY MEDICINE CLINIC | Facility: HOSPITAL | Age: 59
End: 2024-07-24
Payer: COMMERCIAL

## 2024-07-24 VITALS
HEIGHT: 63 IN | DIASTOLIC BLOOD PRESSURE: 62 MMHG | WEIGHT: 174 LBS | OXYGEN SATURATION: 98 % | SYSTOLIC BLOOD PRESSURE: 130 MMHG | HEART RATE: 66 BPM | BODY MASS INDEX: 30.83 KG/M2

## 2024-07-24 DIAGNOSIS — M79.2 NERVE PAIN: Primary | ICD-10-CM

## 2024-07-24 DIAGNOSIS — Z86.39 PERSONAL HISTORY OF OTHER ENDOCRINE, NUTRITIONAL AND METABOLIC DISEASE: ICD-10-CM

## 2024-07-24 DIAGNOSIS — Z13.1 ENCOUNTER FOR SCREENING FOR DIABETES MELLITUS: ICD-10-CM

## 2024-07-24 DIAGNOSIS — B35.1 TOENAIL FUNGUS: ICD-10-CM

## 2024-07-24 PROCEDURE — G2211 COMPLEX E/M VISIT ADD ON: HCPCS | Performed by: STUDENT IN AN ORGANIZED HEALTH CARE EDUCATION/TRAINING PROGRAM

## 2024-07-24 PROCEDURE — 99214 OFFICE O/P EST MOD 30 MIN: CPT | Performed by: STUDENT IN AN ORGANIZED HEALTH CARE EDUCATION/TRAINING PROGRAM

## 2024-07-24 NOTE — PATIENT INSTRUCTIONS
[facility]:   Sat 7- noon  Sun 8- noon    [facility]   [facility] [place]   [facility]

## 2024-07-24 NOTE — PROGRESS NOTES
[facility]   Tootie Garcia DO    Assessment/Plan:      Diagnosis ICD-10-CM Associated Orders   1. Nerve pain  M79.2 Hemoglobin A1C     Vitamin B12     Folate      2. Encounter for screening for diabetes mellitus  Z13.1 Hemoglobin A1C      3. Toenail fungus  B35.1 Efinaconazole 10 % SOLN      4. Personal history of other endocrine, nutritional and metabolic disease  Z86.39 Vitamin B12     Folate        Can do labs non fasting - lipid & glucose will be affecting.   Can do labs first & if normal, consider LE EMG.   Years of worsening toenail fungus b/l. OTC options have not helped. Can try jublia. Oral antifungals with worse SE's.   Has a cologuard at home.   Declined Mammo.   Return in about 8 months (around 3/14/2025) for Annual Medicare Wellness.  Patient may call or return to office with any questions or concerns.   ______________________________________________________________________  Subjective:     Patient ID: Emelyn Domingo is a 59 y.o. female.  HPI  Emelyn Domingo  Chief Complaint   Patient presents with   • Follow-up     Labs not complete from prior visit     Only one able to help her brother for cancer.   Immune system is bad. He has liver CA.  3 appts wkly.     Grandsons here for a few more weeks.   Not much time for herself.     BP looks good today.     Bladder med is helping. Once a day XL.     Still seeing [facility] - no med changes.     Has tried prior toenail otc options & polishes. No benefit or improvement. Affecting only R foot at first, but now both feet.        The following portions of the patient's history were reviewed and updated as appropriate: allergies, current medications, past medical history, and problem list.    Review of Systems   Constitutional:  Positive for fatigue. Negative for chills and fever.   Respiratory:  Negative for cough and shortness of breath.    Cardiovascular:  Negative for chest pain, palpitations and leg swelling.   Skin:         Toenail fungus,  "b/l thickened, yellow, green   Neurological:  Positive for numbness (BOth feet are equal in nerve pain, and achey with temp changes). Negative for dizziness, light-headedness and headaches.       Objective:      Vitals:    07/24/24 1902   BP: 130/62   Pulse: 66   SpO2: 98%      Physical Exam  Vitals and nursing note reviewed.   Constitutional:       General: She is not in acute distress.     Appearance: Normal appearance. She is obese. She is not ill-appearing.   HENT:      Head: Normocephalic and atraumatic.   Eyes:      General: No scleral icterus.        Right eye: No discharge.         Left eye: No discharge.   Cardiovascular:      Rate and Rhythm: Normal rate and regular rhythm.      Pulses: Normal pulses.      Heart sounds: Normal heart sounds. No murmur heard.  Pulmonary:      Effort: Pulmonary effort is normal. No respiratory distress.      Breath sounds: Normal breath sounds. No stridor. No wheezing.   Musculoskeletal:         General: No swelling.      Cervical back: Normal range of motion and neck supple. No rigidity or tenderness.      Right lower leg: No edema.      Left lower leg: No edema.   Lymphadenopathy:      Cervical: No cervical adenopathy.   Neurological:      Mental Status: She is alert and oriented to person, place, and time.      Gait: Gait normal.   Psychiatric:         Mood and Affect: Mood normal.         Behavior: Behavior normal.         Thought Content: Thought content normal.         Judgment: Judgment normal.           Portions of the record may have been created with voice recognition software. Occasional wrong word or \"sound alike\" substitutions may have occurred due to the inherent limitations of voice recognition software. Please review the chart carefully and recognize, using context, where substitutions/typographical errors may have occurred.     "

## 2024-07-29 ENCOUNTER — APPOINTMENT (OUTPATIENT)
Dept: LAB | Facility: HOSPITAL | Age: 59
End: 2024-07-29
Payer: COMMERCIAL

## 2024-07-29 DIAGNOSIS — Z13.1 ENCOUNTER FOR SCREENING FOR DIABETES MELLITUS: ICD-10-CM

## 2024-07-29 DIAGNOSIS — M79.2 NERVE PAIN: ICD-10-CM

## 2024-07-29 DIAGNOSIS — Z13.0 SCREENING FOR IRON DEFICIENCY ANEMIA: ICD-10-CM

## 2024-07-29 DIAGNOSIS — Z86.39 PERSONAL HISTORY OF OTHER ENDOCRINE, NUTRITIONAL AND METABOLIC DISEASE: ICD-10-CM

## 2024-07-29 DIAGNOSIS — Z13.1 SCREENING FOR DIABETES MELLITUS: ICD-10-CM

## 2024-07-29 DIAGNOSIS — Z13.29 SCREENING FOR THYROID DISORDER: ICD-10-CM

## 2024-07-29 DIAGNOSIS — Z13.220 SCREENING FOR HYPERLIPIDEMIA: ICD-10-CM

## 2024-07-29 DIAGNOSIS — Z11.59 NEED FOR HEPATITIS C SCREENING TEST: ICD-10-CM

## 2024-07-29 LAB
ALBUMIN SERPL BCG-MCNC: 4.4 G/DL (ref 3.5–5)
ALP SERPL-CCNC: 46 U/L (ref 34–104)
ALT SERPL W P-5'-P-CCNC: 24 U/L (ref 7–52)
ANION GAP SERPL CALCULATED.3IONS-SCNC: 4 MMOL/L (ref 4–13)
AST SERPL W P-5'-P-CCNC: 35 U/L (ref 13–39)
BASOPHILS # BLD AUTO: 0.01 THOUSANDS/ÂΜL (ref 0–0.1)
BASOPHILS NFR BLD AUTO: 0 % (ref 0–1)
BILIRUB SERPL-MCNC: 0.49 MG/DL (ref 0.2–1)
BUN SERPL-MCNC: 12 MG/DL (ref 5–25)
CALCIUM SERPL-MCNC: 9.7 MG/DL (ref 8.4–10.2)
CHLORIDE SERPL-SCNC: 105 MMOL/L (ref 96–108)
CHOLEST SERPL-MCNC: 164 MG/DL
CO2 SERPL-SCNC: 31 MMOL/L (ref 21–32)
CREAT SERPL-MCNC: 0.71 MG/DL (ref 0.6–1.3)
EOSINOPHIL # BLD AUTO: 0.06 THOUSAND/ÂΜL (ref 0–0.61)
EOSINOPHIL NFR BLD AUTO: 1 % (ref 0–6)
ERYTHROCYTE [DISTWIDTH] IN BLOOD BY AUTOMATED COUNT: 11.2 % (ref 11.6–15.1)
EST. AVERAGE GLUCOSE BLD GHB EST-MCNC: 123 MG/DL
FOLATE SERPL-MCNC: 11.9 NG/ML
GFR SERPL CREATININE-BSD FRML MDRD: 92 ML/MIN/1.73SQ M
GLUCOSE P FAST SERPL-MCNC: 120 MG/DL (ref 65–99)
HBA1C MFR BLD: 5.9 %
HCT VFR BLD AUTO: 43 % (ref 34.8–46.1)
HCV AB SER QL: NORMAL
HDLC SERPL-MCNC: 56 MG/DL
HGB BLD-MCNC: 14.4 G/DL (ref 11.5–15.4)
IMM GRANULOCYTES # BLD AUTO: 0.01 THOUSAND/UL (ref 0–0.2)
IMM GRANULOCYTES NFR BLD AUTO: 0 % (ref 0–2)
LDLC SERPL CALC-MCNC: 98 MG/DL (ref 0–100)
LYMPHOCYTES # BLD AUTO: 1.44 THOUSANDS/ÂΜL (ref 0.6–4.47)
LYMPHOCYTES NFR BLD AUTO: 29 % (ref 14–44)
MCH RBC QN AUTO: 31.5 PG (ref 26.8–34.3)
MCHC RBC AUTO-ENTMCNC: 33.5 G/DL (ref 31.4–37.4)
MCV RBC AUTO: 94 FL (ref 82–98)
MONOCYTES # BLD AUTO: 0.22 THOUSAND/ÂΜL (ref 0.17–1.22)
MONOCYTES NFR BLD AUTO: 4 % (ref 4–12)
NEUTROPHILS # BLD AUTO: 3.23 THOUSANDS/ÂΜL (ref 1.85–7.62)
NEUTS SEG NFR BLD AUTO: 66 % (ref 43–75)
NRBC BLD AUTO-RTO: 0 /100 WBCS
PLATELET # BLD AUTO: 228 THOUSANDS/UL (ref 149–390)
PMV BLD AUTO: 9.7 FL (ref 8.9–12.7)
POTASSIUM SERPL-SCNC: 4.5 MMOL/L (ref 3.5–5.3)
PROT SERPL-MCNC: 7.5 G/DL (ref 6.4–8.4)
RBC # BLD AUTO: 4.57 MILLION/UL (ref 3.81–5.12)
SODIUM SERPL-SCNC: 140 MMOL/L (ref 135–147)
TRIGL SERPL-MCNC: 51 MG/DL
TSH SERPL DL<=0.05 MIU/L-ACNC: 0.71 UIU/ML (ref 0.45–4.5)
VIT B12 SERPL-MCNC: 225 PG/ML (ref 180–914)
WBC # BLD AUTO: 4.97 THOUSAND/UL (ref 4.31–10.16)

## 2024-07-29 PROCEDURE — 82607 VITAMIN B-12: CPT

## 2024-07-29 PROCEDURE — 84443 ASSAY THYROID STIM HORMONE: CPT

## 2024-07-29 PROCEDURE — 80053 COMPREHEN METABOLIC PANEL: CPT

## 2024-07-29 PROCEDURE — 83036 HEMOGLOBIN GLYCOSYLATED A1C: CPT

## 2024-07-29 PROCEDURE — 36415 COLL VENOUS BLD VENIPUNCTURE: CPT

## 2024-07-29 PROCEDURE — 86803 HEPATITIS C AB TEST: CPT

## 2024-07-29 PROCEDURE — 85025 COMPLETE CBC W/AUTO DIFF WBC: CPT

## 2024-07-29 PROCEDURE — 82746 ASSAY OF FOLIC ACID SERUM: CPT

## 2024-07-29 PROCEDURE — 80061 LIPID PANEL: CPT

## 2024-09-08 ENCOUNTER — OFFICE VISIT (OUTPATIENT)
Dept: URGENT CARE | Facility: CLINIC | Age: 59
End: 2024-09-08
Payer: COMMERCIAL

## 2024-09-08 VITALS
RESPIRATION RATE: 18 BRPM | DIASTOLIC BLOOD PRESSURE: 80 MMHG | HEART RATE: 74 BPM | SYSTOLIC BLOOD PRESSURE: 110 MMHG | OXYGEN SATURATION: 96 % | TEMPERATURE: 98.7 F

## 2024-09-08 DIAGNOSIS — H10.33 ACUTE BACTERIAL CONJUNCTIVITIS OF BOTH EYES: Primary | ICD-10-CM

## 2024-09-08 PROCEDURE — G0463 HOSPITAL OUTPT CLINIC VISIT: HCPCS | Performed by: PHYSICIAN ASSISTANT

## 2024-09-08 PROCEDURE — 99213 OFFICE O/P EST LOW 20 MIN: CPT | Performed by: PHYSICIAN ASSISTANT

## 2024-09-08 RX ORDER — TOBRAMYCIN 3 MG/ML
2 SOLUTION/ DROPS OPHTHALMIC 4 TIMES DAILY
Qty: 5 ML | Refills: 0 | Status: SHIPPED | OUTPATIENT
Start: 2024-09-08

## 2024-09-08 NOTE — PROGRESS NOTES
[facility]        NAME: Emelyn Domingo is a 59 y.o. female  : 1965    MRN: [id number]  DATE: 2024  TIME: 12:35 PM    /80   Pulse 74   Temp 98.7 °F (37.1 °C)   Resp 18   SpO2 96%     Assessment and Plan   Acute bacterial conjunctivitis of both eyes [H10.33]  1. Acute bacterial conjunctivitis of both eyes  tobramycin (TOBREX) 0.3 % SOLN            Patient Instructions       Follow up with PCP in 3-5 days.  Proceed to  ER if symptoms worsen.    Chief Complaint     Chief Complaint   Patient presents with    Possible B/L [place] [facility]     Pt reports b/l eyes were crusted shut this AM. Pt reports day 3 of sx.          History of Present Illness       Pt with bilat eye redness and d./c x 2 days,  some congestion         Review of Systems   Review of Systems   Constitutional: Negative.    HENT: Negative.     Eyes:  Positive for discharge and redness.   Respiratory: Negative.     Cardiovascular: Negative.    Gastrointestinal: Negative.    Endocrine: Negative.    Genitourinary: Negative.    Musculoskeletal: Negative.    Skin: Negative.    Allergic/Immunologic: Negative.    Neurological: Negative.    Hematological: Negative.    Psychiatric/Behavioral: Negative.     All other systems reviewed and are negative.        Current Medications       Current Outpatient Medications:     HYDROmorphone (DILAUDID) 8 MG tablet, take 1 tablet as needed every 6 hrs Orally 30 days fill on/after 21, Disp: , Rfl:     morphine (MS CONTIN) 60 mg 12 hr tablet, Take 60 mg by mouth 3 (three) times a day, Disp: , Rfl:     oxybutynin (DITROPAN-XL) 5 mg 24 hr tablet, Take 1 tablet (5 mg total) by mouth daily, Disp: 30 tablet, Rfl: 5    tobramycin (TOBREX) 0.3 % SOLN, Administer 2 drops to both eyes 4 (four) times a day, Disp: 5 mL, Rfl: 0    Efinaconazole 10 % SOLN, Apply 1 Application topically in the morning (Patient not taking: Reported on 2024), Disp: 12 mL, Rfl: 1    Movantik 25 MG tablet, , Disp: , Rfl:      ondansetron (ZOFRAN-ODT) 8 mg disintegrating tablet, 1 every 8 hours as needed, Disp: , Rfl:     Current Allergies     Allergies as of 09/08/2024 - Reviewed 09/08/2024   Allergen Reaction Noted    Demerol [meperidine] Hallucinations 10/10/2019    Latex Rash 03/08/2016            The following portions of the patient's history were reviewed and updated as appropriate: allergies, current medications, past family history, past medical history, past social history, past surgical history and problem list.     Past Medical History:   Diagnosis Date    CRPS (complex regional pain syndrome type II)        Past Surgical History:   Procedure Laterality Date    CERVICAL FUSION      level 5 6    FOOT SURGERY      HERNIA REPAIR         Family History   Problem Relation Age of Onset    Lung cancer Mother     Stroke Father     Mental illness Father     Kidney cancer Sister     Mental illness Sister     Alcohol abuse Brother     Rheum arthritis Maternal Grandmother     Breast cancer Sister     Cirrhosis Sister          Medications have been verified.        Objective   /80   Pulse 74   Temp 98.7 °F (37.1 °C)   Resp 18   SpO2 96%        Physical Exam     Physical Exam  Vitals and nursing note reviewed.   Constitutional:       Appearance: Normal appearance. She is normal weight.   HENT:      Head: Normocephalic and atraumatic.      Right Ear: Tympanic membrane, ear canal and external ear normal.      Left Ear: Tympanic membrane, ear canal and external ear normal.      Nose: Rhinorrhea present.      Mouth/Throat:      Mouth: Mucous membranes are moist.      Pharynx: Oropharynx is clear.   Eyes:      Extraocular Movements: Extraocular movements intact.      Pupils: Pupils are equal, round, and reactive to light.      Comments: Perrl eom wnl conj injected + d/c bilat  + red reflex bilat anterior chamber wnl no f/o seen no f/o under either lid   lids wnl bilat    Cardiovascular:      Rate and Rhythm: Normal rate and regular  rhythm.      Pulses: Normal pulses.      Heart sounds: Normal heart sounds.   Pulmonary:      Effort: Pulmonary effort is normal.      Breath sounds: Normal breath sounds.   Abdominal:      General: Bowel sounds are normal.      Palpations: Abdomen is soft.   Musculoskeletal:         General: Normal range of motion.      Cervical back: Normal range of motion and neck supple.   Skin:     General: Skin is warm.      Capillary Refill: Capillary refill takes less than 2 seconds.   Neurological:      Mental Status: She is alert and oriented to person, place, and time.   Psychiatric:         Mood and Affect: Mood normal.

## 2024-12-02 ENCOUNTER — RESULTS FOLLOW-UP (OUTPATIENT)
Dept: FAMILY MEDICINE CLINIC | Facility: HOSPITAL | Age: 59
End: 2024-12-02

## 2024-12-02 DIAGNOSIS — Z13.220 SCREENING FOR HYPERLIPIDEMIA: ICD-10-CM

## 2024-12-02 DIAGNOSIS — E53.8 B12 DEFICIENCY: ICD-10-CM

## 2024-12-02 DIAGNOSIS — M79.2 NERVE PAIN: Primary | ICD-10-CM

## 2024-12-02 DIAGNOSIS — R73.01 IFG (IMPAIRED FASTING GLUCOSE): ICD-10-CM

## 2024-12-10 ENCOUNTER — OFFICE VISIT (OUTPATIENT)
Dept: FAMILY MEDICINE CLINIC | Facility: HOSPITAL | Age: 59
End: 2024-12-10
Payer: COMMERCIAL

## 2024-12-10 VITALS
TEMPERATURE: 97.7 F | HEART RATE: 88 BPM | SYSTOLIC BLOOD PRESSURE: 110 MMHG | WEIGHT: 178 LBS | DIASTOLIC BLOOD PRESSURE: 76 MMHG | OXYGEN SATURATION: 97 % | BODY MASS INDEX: 31.54 KG/M2 | HEIGHT: 63 IN

## 2024-12-10 DIAGNOSIS — N81.10 ACQUIRED FEMALE BLADDER PROLAPSE: ICD-10-CM

## 2024-12-10 DIAGNOSIS — N39.0 FREQUENT UTI: ICD-10-CM

## 2024-12-10 DIAGNOSIS — N30.01 ACUTE CYSTITIS WITH HEMATURIA: Primary | ICD-10-CM

## 2024-12-10 LAB
SL AMB  POCT GLUCOSE, UA: ABNORMAL
SL AMB LEUKOCYTE ESTERASE,UA: ABNORMAL
SL AMB POCT BILIRUBIN,UA: ABNORMAL
SL AMB POCT BLOOD,UA: ABNORMAL
SL AMB POCT CLARITY,UA: CLEAR
SL AMB POCT COLOR,UA: ABNORMAL
SL AMB POCT KETONES,UA: ABNORMAL
SL AMB POCT NITRITE,UA: ABNORMAL
SL AMB POCT PH,UA: 6
SL AMB POCT SPECIFIC GRAVITY,UA: 1.01
SL AMB POCT URINE PROTEIN: ABNORMAL
SL AMB POCT UROBILINOGEN: 3.5

## 2024-12-10 PROCEDURE — 81003 URINALYSIS AUTO W/O SCOPE: CPT | Performed by: NURSE PRACTITIONER

## 2024-12-10 PROCEDURE — 87077 CULTURE AEROBIC IDENTIFY: CPT | Performed by: NURSE PRACTITIONER

## 2024-12-10 PROCEDURE — G2211 COMPLEX E/M VISIT ADD ON: HCPCS | Performed by: NURSE PRACTITIONER

## 2024-12-10 PROCEDURE — 99213 OFFICE O/P EST LOW 20 MIN: CPT | Performed by: NURSE PRACTITIONER

## 2024-12-10 PROCEDURE — 87086 URINE CULTURE/COLONY COUNT: CPT | Performed by: NURSE PRACTITIONER

## 2024-12-10 PROCEDURE — 87186 SC STD MICRODIL/AGAR DIL: CPT | Performed by: NURSE PRACTITIONER

## 2024-12-10 RX ORDER — CEPHALEXIN 500 MG/1
500 CAPSULE ORAL 2 TIMES DAILY
Qty: 14 CAPSULE | Refills: 0 | Status: SHIPPED | OUTPATIENT
Start: 2024-12-10 | End: 2024-12-17

## 2024-12-10 RX ORDER — OXYCODONE HYDROCHLORIDE 15 MG/1
15 TABLET ORAL EVERY 6 HOURS PRN
COMMUNITY
Start: 2024-12-02

## 2024-12-10 NOTE — ASSESSMENT & PLAN NOTE
Patient:   JAQUELIN JAQUEZ            MRN: [id number]            FIN: [id number]              Age:   16 years     Sex:  MALE     :  02   Associated Diagnoses:   None   Author:   URI HICKEY     History of Present Illness   Problem Statement   Patient is a 17yo M with poorly controlled Chron's disease who presented to the pediatric floor for elective PICC line placement and initiation of TPN. Now s/p I&D of multiple ashley-rectal abscesses on . He was inially placed in airborne isolation due to positive quantiferon gold on 7/3. Repeat quant gold  and  negative.  .     Interval Events   Date  2019.  Patient afebrile overnight, no acute events.  Patient passing gas through his ileostomy, pain well-controlled with tylenol PRN.  Tolerating CLD.  No stool in ostomy yet.  Culture from patient's perianal abscess is growing several different types of bacteria including Pseudomonas.  We will continue Zosyn until susceptibilities from culture result..       Review / Management   Vital Signs     Vitals between:   13-AUG-2019 07:39:50   TO   14-AUG-2019 07:39:50                   LAST RESULT MINIMUM MAXIMUM  Temperature 36.1 35.9 36.7  Heart Rate 65 55 93  Respiratory Rate 18 14 18  NISBP           120 104 120  NIDBP           65 47 65  NIMBP           83 69 83    Measurements   % Weight Change (ST)     Dosing Weight:   62.6 kg    2019 19:32  Most Recent Clinical Weight:   60.9  kg    2019 20:35    Previous Clinical Weight:   61.3  kg 08/10/2019 21:40  Changed:   -   %0.65    Medications:  Current Medications (ST)     Medications (23) Active  Scheduled: (10)  *Do NOT give NSAIDs  1 each, N/A, Daily  Acetaminophen 325 mg tab  650 mg 2 tab, Oral, Q8H  Chlorhexidine gluconate 0.12% ORAL RINSE 15 mL repack  15 mL, Oral Mucosa, Q12H  Docusate sodium 100 mg cap  300 mg 3 cap, Oral, TID  Loratadine 10 mg tab  10 mg 1 tab, Oral, Daily  Melatonin 3 mg tab  3 mg 1 tab, Oral, Q  History of bladder prolapse with recurrent UTI.  Symptoms started 4-5 days ago.  Dysuria, increased frequency/urgency.  Mild flank pain.  Bowels are moving.  No fever/chills/change in appetite.    Tried to push fluids and cranberry juice without improvement.    -NAD, S1-S2 RRR.  Lungs CTA.  Abdomen with suprapubic tenderness otherwise nondistended.  BS X4.  Negative CVA tenderness bilaterally.  -Urine dip in office positive leuks positive nitrates positive blood.  Will send for culture and treat with Keflex as Emelyn reports this is been effective for her in the past.  Encouraged her to follow-up with urology regarding bladder prolapse with verbalized understanding.   Bedtime  methylPREDNISolone Na succinate PF  10 mg 0.25 mL, Slow IV Push, Q12H  Ondansetron 4 mg/2 mL inj SDV  4 mg 2 mL, Slow IV Push, Q6H  piperacillin-tazobactam  3.375 gm, IVPB, Q6H (variable)  Polyethylene glycol 3350 oral recon powder 17 gm packet UD  17 gm 1 packet, Oral, Daily  Continuous: (3)  fat emulsion 20% 500 mL  500 mL, IV, 36 mL/hr  Lactated Ringers 1,000 mL  1,000 mL, IV, 80 mL/hr  TPN pediatric CENTRAL LINE 2,000 mL  2,000 mL, IV  PRN: (10)  Acetaminophen 325 mg tab  650 mg 2 tab, Oral, Q6H  Codeine sulfate 30 mg tab  15 mg 0.5 tab, Oral, Q6H  DiazePAM 2 mg tab  2 mg 1 tab, Oral, Once (scheduled)  Hyoscyamine 0.125 mg SL tab  0.125 mg 1 tab, SL, Q4H  Lidocaine 4% cream 5 gm  1 application, Topical, As Directed PRN  LORazepam 2 mg/1 mL inj SDV  1 mg 0.5 mL, Slow IV Push, Q4H  MetoCLOPramide 10 mg tab  10 mg 1 tab, Oral, Q6H  Morphine PF 2 mg/1 mL inj SDV  2 mg 1 mL, Slow IV Push, Q2H  Sodium chloride PF 0.9% flush inj 10 mL  5 mL, Flush, As Directed PRN  Zinc oxide 12.8% paste 60 gm  1 application, Topical, As Directed PRN  ,  Active Home Medications (ST)     Home Medications (2) Active  methotrexate 2.5 mg oral tablet 15 mg = 6 tab, Oral, Q Sunday  Xopenex HFA inhaler oral 45 mcg/puff 2 puff, PRN, Inhaled, Q6H  .    Intake and Output:  Intake and Output (ST)   I & O between:  13-AUG-2019 07:39 TO 14-AUG-2019 07:39  Med Dosing Weight:  62.6  kg   09-AUG-2019  24 Hour Intake:   3378.50  ( 53.97 mL/kg )  24 Hour Output:   3625.00           24 Hour Urine/Stool Output:   0.0  24 Hour Balance:   -246.50           24 Hour Urine Output:   2925.00  ( 1.95 mL/kg/hr )  .    Lab results:  Labs (ST)   No Qualifying Labs are resulted on this patient in the last 24 hours  .    Diagnostic Imaging:  X-ray (ST)    , CT (ST)    , US (ST)    , MRI (ST)    .    Lines and Tubes:  Lines, Tubes, Drains (ST)   LINES  Central Catheter PICC LINE   Charted: 08/13/19 21:00  Inserted: 07/18/19   Days Since Insertion: 26  days  Indication of Use: TPN  .      Diagnosis/Problem List   Problem   Problems (ST)   Active Problems (4)  Asthma   Crohn's disease   Crohns disease   History of Clostridium difficile intestinal infection   .       Physical Examination   General:  Alert and oriented, No acute distress.         Appearance: Well nourished, Calm.    Eye:  Pupils are equal, round and reactive to light, Extraocular movements are intact, Normal conjunctiva.   HENT:  Normocephalic, Atraumatic.    Neurologic:  Alert, Oriented, No focal deficits.    Respiratory:  Lungs are clear to auscultation, Respirations are non-labored, Breath sounds are equal.   Cardiovascular:  Normal rate, Regular rhythm, No murmur, Good pulses equal in all extremities, Normal peripheral perfusion, No edema.   Gastrointestinal:  Soft, Non-tender, Non-distended, Normal bowel sounds, Ileostomy in place on Left lower abd, surgical site c/d/i.   Genitourinary:  Penrose drains in place with minimal drainage, no obvious surrounding erythema .   Musculoskeletal:  Normal range of motion, Normal strength, No swelling.   Integumentary:  Warm, Dry, Pink, No rash.    Psychiatric:  Cooperative.      Assessment and Plan   A/P:  A/P::  Bernard is a 15yo M with poorly controlled Chron's disease with extensive perianal disease who was admitted for PICC line placement and initiation of TPN. Patient was febrile on admission but well appearing. No clear infectious source at this time. Blood and urine cultures sent and were negative. Due to positive quant gold as an outpatient, pediatric infectious diseases was consulted. Repeat quant gold on 7/17 and 7/22 were  negative.  Multiple fistulas and abscesses seen on MRI 7/20. Underwent I&D with penrose drain placement on 7/24. Had diagnostic colonoscopy 8/9 to aid in establishing plan for ileostomy.  Will attempt to advance slowly to full liquid diet today.  Culture from patient's perianal abscess is growing several different types of  bacteria including Pseudomonas.  We will continue Zosyn until susceptibilities from culture result.  Will ctm for ostomy output.  FEN/GI: Poorly controlled Crohn's disease, multiple fistula and abscess s/p I&D with Penrose drains placement on 7/24  - Bry try Full LIquid Diet today!  - Central TPN. Cycling over 14 hrs. Goal calories > 2500kcal/day.   -CMP, mag, phos, TGs on M, W, F in the evenings  - Adult GI consulted, appreciate recs    - Colorectal surgery on consult due to MRI showing multiple abscesses and fistulas       - s/p I&D with penrose drains placed 7/29   - s/p colonoscopy 8/9   - s/p Ileostomy with CRS 8/12    - stoma care per CRS nurse    - OK to ambulate as tolerated    - CLD for now, will likely advance later this week    - will continue TPN / IVF  - Pain control: Tylenol 650mg q6h PRN pain < 5. Codeine 15mg q6h >6  - Goal to begin Entyvio/Sterara as outpatient in 1-2 weeks.   - Will need reimaging at some point  - Home MTX on hold  - Methylprednisolone 10mg BID     -s7/29 at 20mg BID, completed wean to 10mg BID on 8/7, which will be home dose  - Levsin q4h PRN abdominal cramps  - Docusate 300mg QID  ID:   - BCx q24h with new fevers  - will continue Zosyn for now   -culture pending, will f/u sensitivities  - Quant gold negative x 2, Mycobacterial culture NGTD, isolation precautions discontinued.  - Tylenol 650mg PO q6h prn  - Obtain BCx q24h if febrile  - 14-day Zosyn course completed 8/4  - Febrile 7/29, 7/30, 7/31.  Blood and urine Cx's NGTD  - CBC, ESR, CRP biweekly (next 8/19)  CV/Resp: Hemodynamically stable on room air  - Vitals q4h  - Pulmonary nodules on CT 7/18/19 stable from CT in 2018. Will need to follow up outpatient with pulmonology  - Discussed with pediatric pulmonology. They suggest outpatient follow up with PFTs and repeat imaging in 1 year.  - Adult pulm (Dr. Arielle Maradiaga) on consult for hyperbaric oxygen therapy, appreciate recs        -Process started for initiation of  hyperbaric O2 therapy for Crohn's, f/u approval  - Abnormalities seen on tele when receiving K rider.  EKG and ECHO wnl.  Cards signed off.  Heme:  - Due to Crohn's, do not start on Lovenox  Social:  - Dr. Glez, Psychiatry consulted for outpatient resources  - Dr. Friedman of [facility] consulted for potenital initiation of antidepressive therapy after patient's upcoming surgeries  -she will f/u with patient next week  - Will monitor closely for s/s depression or SI, no current concerns  VTE Score: +2 for systemic inflammation and recent surgery, mobility altered. SCDs ordered  Lines: PIV x1, NGT  the functionality, utilization, and necessity of all lines, drains, and tubes have been discussed and assessed  Dispo: Continue monitor on the floor for adequate management of Crohn's and plan for home care.  Plan was discussed with supervising attending and nursing team. Above assessment and plan was discussed with family who agreed. All questions addressed.  Alexia Hdz MD  PGY2 Pediatrics Resident  [facility] [employer]   .  Patient seen and examined by me.  Chart reviewed.  Parent not at bedside  Agree with above note, assessment and plan.  No objection to full liquid diet today.

## 2024-12-10 NOTE — PROGRESS NOTES
[facility]   Massiel ZIEGLER    Assessment/Plan:   1. Acute cystitis with hematuria  Assessment & Plan:  History of bladder prolapse with recurrent UTI.  Symptoms started 4-5 days ago.  Dysuria, increased frequency/urgency.  Mild flank pain.  Bowels are moving.  No fever/chills/change in appetite.    Tried to push fluids and cranberry juice without improvement.    -NAD, S1-S2 RRR.  Lungs CTA.  Abdomen with suprapubic tenderness otherwise nondistended.  BS X4.  Negative CVA tenderness bilaterally.  -Urine dip in office positive leuks positive nitrates positive blood.  Will send for culture and treat with Keflex as Emelyn reports this is been effective for her in the past.  Encouraged her to follow-up with urology regarding bladder prolapse with verbalized understanding.  Orders:  -     POCT urine dip auto non-scope  -     cephalexin (KEFLEX) 500 mg capsule; Take 1 capsule (500 mg total) by mouth 2 (two) times a day for 7 days  2. Frequent UTI  -     POCT urine dip auto non-scope  3. Acquired female bladder prolapse  -     POCT urine dip auto non-scope      No follow-ups on file.  Patient may call or return to office with any questions or concerns.     ______________________________________________________________________  Subjective:     Patient ID: Emelyn Domingo is a 59 y.o. female.  Emelyn Domingo  No chief complaint on file.    HPI           The following portions of the patient's history were reviewed and updated as appropriate: allergies, current medications, past family history, past medical history, past social history, past surgical history, and problem list.    Review of Systems   Constitutional: Negative.  Negative for activity change, appetite change, chills, fatigue and fever.   HENT: Negative.  Negative for congestion, ear pain, postnasal drip and sinus pain.    Eyes: Negative.    Respiratory: Negative.  Negative for cough and shortness of breath.    Cardiovascular: Negative.  Negative  for chest pain and leg swelling.   Gastrointestinal:  Positive for abdominal pain. Negative for constipation and diarrhea.   Endocrine: Negative.    Genitourinary:  Positive for dysuria, flank pain, frequency, hematuria and urgency.   Skin: Negative.    Allergic/Immunologic: Negative.  Negative for immunocompromised state.   Neurological: Negative.  Negative for dizziness and light-headedness.   Hematological: Negative.    Psychiatric/Behavioral: Negative.           Objective:      Vitals:    12/10/24 1430   BP: 110/76   Pulse: 88   Temp: 97.7 °F (36.5 °C)   SpO2: 97%      Physical Exam  Vitals and nursing note reviewed.   Constitutional:       Appearance: Normal appearance.   HENT:      Head: Normocephalic and atraumatic.      Right Ear: Tympanic membrane, ear canal and external ear normal.      Left Ear: Tympanic membrane, ear canal and external ear normal.      Nose: Nose normal.      Mouth/Throat:      Mouth: Mucous membranes are moist.      Pharynx: Oropharynx is clear.   Eyes:      Extraocular Movements: Extraocular movements intact.      Conjunctiva/sclera: Conjunctivae normal.      Pupils: Pupils are equal, round, and reactive to light.   Cardiovascular:      Rate and Rhythm: Normal rate and regular rhythm.      Pulses: Normal pulses.      Heart sounds: Normal heart sounds.   Pulmonary:      Effort: Pulmonary effort is normal.      Breath sounds: Normal breath sounds.   Abdominal:      General: Bowel sounds are normal.      Palpations: Abdomen is soft.      Tenderness: There is abdominal tenderness in the suprapubic area. There is no right CVA tenderness or left CVA tenderness.   Musculoskeletal:         General: Normal range of motion.      Cervical back: Normal range of motion and neck supple.   Skin:     General: Skin is warm and dry.   Neurological:      General: No focal deficit present.      Mental Status: She is alert and oriented to person, place, and time.   Psychiatric:         Mood and Affect:  "Mood normal.         Behavior: Behavior normal.         Thought Content: Thought content normal.         Judgment: Judgment normal.           Portions of the record may have been created with voice recognition software. Occasional wrong word or \"sound alike\" substitutions may have occurred due to the inherent limitations of voice recognition software. Please review the chart carefully and recognize, using context, where substitutions/typographical errors may have occurred.       "

## 2024-12-11 ENCOUNTER — RESULTS FOLLOW-UP (OUTPATIENT)
Dept: FAMILY MEDICINE CLINIC | Facility: HOSPITAL | Age: 59
End: 2024-12-11

## 2024-12-12 LAB — BACTERIA UR CULT: ABNORMAL

## 2025-01-09 PROBLEM — N30.01 ACUTE CYSTITIS WITH HEMATURIA: Status: RESOLVED | Noted: 2024-12-10 | Resolved: 2025-01-09

## 2025-01-15 NOTE — TELEPHONE ENCOUNTER
SYBIL for pt to schedule AWV    Provided call back # [phone]   [Negative] : Skin [Shortness Of Breath] : no shortness of breath [Cough] : cough [Wheezing] : no wheezing [LMP___] : The patient's last menstrual period was [unfilled]

## 2025-03-12 ENCOUNTER — OFFICE VISIT (OUTPATIENT)
Dept: FAMILY MEDICINE CLINIC | Facility: HOSPITAL | Age: 60
End: 2025-03-12
Payer: COMMERCIAL

## 2025-03-12 VITALS
HEIGHT: 63 IN | WEIGHT: 178 LBS | OXYGEN SATURATION: 99 % | DIASTOLIC BLOOD PRESSURE: 84 MMHG | SYSTOLIC BLOOD PRESSURE: 110 MMHG | HEART RATE: 78 BPM | BODY MASS INDEX: 31.54 KG/M2

## 2025-03-12 DIAGNOSIS — N30.01 ACUTE CYSTITIS WITH HEMATURIA: Primary | ICD-10-CM

## 2025-03-12 DIAGNOSIS — N39.0 FREQUENT UTI: ICD-10-CM

## 2025-03-12 DIAGNOSIS — F11.20 CHRONIC NARCOTIC DEPENDENCE (HCC): ICD-10-CM

## 2025-03-12 DIAGNOSIS — N39.46 MIXED INCONTINENCE URGE AND STRESS: ICD-10-CM

## 2025-03-12 DIAGNOSIS — R09.81 SINUS CONGESTION: ICD-10-CM

## 2025-03-12 PROBLEM — F33.1 MAJOR DEPRESSIVE DISORDER, RECURRENT, MODERATE (HCC): Status: RESOLVED | Noted: 2024-03-13 | Resolved: 2025-03-12

## 2025-03-12 LAB
SL AMB  POCT GLUCOSE, UA: NORMAL
SL AMB LEUKOCYTE ESTERASE,UA: NORMAL
SL AMB POCT BILIRUBIN,UA: NORMAL
SL AMB POCT BLOOD,UA: NORMAL
SL AMB POCT CLARITY,UA: CLEAR
SL AMB POCT COLOR,UA: YELLOW
SL AMB POCT KETONES,UA: NORMAL
SL AMB POCT NITRITE,UA: NORMAL
SL AMB POCT PH,UA: 6
SL AMB POCT RAPID FLU A: NORMAL
SL AMB POCT RAPID FLU B: NORMAL
SL AMB POCT SPECIFIC GRAVITY,UA: 1.01
SL AMB POCT URINE PROTEIN: NORMAL
SL AMB POCT UROBILINOGEN: 3.5

## 2025-03-12 PROCEDURE — 87804 INFLUENZA ASSAY W/OPTIC: CPT | Performed by: STUDENT IN AN ORGANIZED HEALTH CARE EDUCATION/TRAINING PROGRAM

## 2025-03-12 PROCEDURE — 81003 URINALYSIS AUTO W/O SCOPE: CPT | Performed by: STUDENT IN AN ORGANIZED HEALTH CARE EDUCATION/TRAINING PROGRAM

## 2025-03-12 PROCEDURE — G2211 COMPLEX E/M VISIT ADD ON: HCPCS | Performed by: STUDENT IN AN ORGANIZED HEALTH CARE EDUCATION/TRAINING PROGRAM

## 2025-03-12 PROCEDURE — 99214 OFFICE O/P EST MOD 30 MIN: CPT | Performed by: STUDENT IN AN ORGANIZED HEALTH CARE EDUCATION/TRAINING PROGRAM

## 2025-03-12 PROCEDURE — 87077 CULTURE AEROBIC IDENTIFY: CPT | Performed by: STUDENT IN AN ORGANIZED HEALTH CARE EDUCATION/TRAINING PROGRAM

## 2025-03-12 PROCEDURE — 87186 SC STD MICRODIL/AGAR DIL: CPT | Performed by: STUDENT IN AN ORGANIZED HEALTH CARE EDUCATION/TRAINING PROGRAM

## 2025-03-12 PROCEDURE — 87086 URINE CULTURE/COLONY COUNT: CPT | Performed by: STUDENT IN AN ORGANIZED HEALTH CARE EDUCATION/TRAINING PROGRAM

## 2025-03-12 RX ORDER — CEPHALEXIN 500 MG/1
500 CAPSULE ORAL 2 TIMES DAILY
Qty: 14 CAPSULE | Refills: 0 | Status: SHIPPED | OUTPATIENT
Start: 2025-03-12 | End: 2025-03-19

## 2025-03-12 NOTE — PATIENT INSTRUCTIONS
Dr. Thornton  - Urogyblanka [facility]kee - [facility] UROGYBLANKA    Call to make appt for Mammo & US

## 2025-03-12 NOTE — PROGRESS NOTES
Name: Emelyn Domingo      : 1965      MRN: [id number]  Encounter Provider: Tootie Garcia DO  Encounter Date: 3/12/2025   Encounter department: [facility] [address]  :  Assessment & Plan  Acute cystitis with hematuria  Start keflex today.   May need probiotics.   Watch for diarrhea.   Orders:  •  cephalexin (KEFLEX) 500 mg capsule; Take 1 capsule (500 mg total) by mouth 2 (two) times a day for 7 days  •  POCT urine dip auto non-scope  •  Urine culture; Future    Frequent UTI  Needs pelvis surgery to ultimately fix recurrence of uti's.   Orders:  •  POCT urine dip auto non-scope  •  Urine culture; Future    Mixed incontinence urge and stress  F/W Urogyn upcoming.   Orders:  •  POCT urine dip auto non-scope  •  Urine culture; Future    Chronic narcotic dependence (HCC)  F/W SE pain mgmt.   PDMP reviewed.        Sinus congestion    Orders:  •  POCT rapid flu A and B    No history of depression, not sure how it got in her chart.        History of Present Illness     Chief Complaint   Patient presents with   • Urinary Tract Infection     AWV scheduled for 3/26   • Sinusitis   • Female Dysuria     Started 1 week ago or so with UTI.   Woke up today feeling much worse.   No recent sex to trigger, but dealing with chronic incontinence & leaking.   Will see urogyn in a few months. Interested in bladder sling.     Given keflex in March,  & Dec for UTI's.    No abx allergies.     Review of Systems   Constitutional:  Positive for fatigue. Negative for chills and fever.   HENT:  Positive for sinus pressure and sinus pain.    Respiratory:  Negative for cough and shortness of breath.    Cardiovascular:  Negative for chest pain and palpitations.   Gastrointestinal:  Positive for nausea (2 days ago bad). Negative for diarrhea and vomiting.   Genitourinary:  Positive for dysuria, frequency, pelvic pain (a little) and urgency. Negative for flank pain and hematuria.   Neurological:  Positive for  "headaches. Negative for dizziness and light-headedness.     Objective   /84   Pulse 78   Ht 5' 3\" (1.6 m)   Wt 80.7 kg (178 lb)   SpO2 99%   BMI 31.53 kg/m²      Physical Exam  Vitals and nursing note reviewed.   Constitutional:       General: She is not in acute distress.     Appearance: Normal appearance. She is well-developed. She is not ill-appearing.   HENT:      Head: Normocephalic and atraumatic.      Right Ear: Tympanic membrane, ear canal and external ear normal. There is no impacted cerumen.      Left Ear: Tympanic membrane, ear canal and external ear normal. There is no impacted cerumen.      Nose: Nose normal. No congestion.      Mouth/Throat:      Mouth: Mucous membranes are moist.      Pharynx: Oropharynx is clear. No oropharyngeal exudate.   Eyes:      General: No scleral icterus.        Right eye: No discharge.         Left eye: No discharge.      Conjunctiva/sclera: Conjunctivae normal.   Cardiovascular:      Rate and Rhythm: Normal rate and regular rhythm.      Pulses: Normal pulses.      Heart sounds: Normal heart sounds. No murmur heard.  Pulmonary:      Effort: Pulmonary effort is normal. No respiratory distress.      Breath sounds: Normal breath sounds.   Abdominal:      General: Abdomen is flat. Bowel sounds are normal. There is no distension.      Tenderness: There is abdominal tenderness (suprapubic). There is no right CVA tenderness or left CVA tenderness.   Musculoskeletal:      Cervical back: Normal range of motion and neck supple. No rigidity.      Right lower leg: No edema.      Left lower leg: No edema.   Skin:     General: Skin is warm and dry.      Capillary Refill: Capillary refill takes less than 2 seconds.   Neurological:      Mental Status: She is alert and oriented to person, place, and time.      Gait: Gait normal.   Psychiatric:         Mood and Affect: Mood normal.         Behavior: Behavior normal.         Thought Content: Thought content normal.         Judgment: " Judgment normal.

## 2025-03-14 ENCOUNTER — RESULTS FOLLOW-UP (OUTPATIENT)
Dept: FAMILY MEDICINE CLINIC | Facility: HOSPITAL | Age: 60
End: 2025-03-14

## 2025-03-14 LAB — BACTERIA UR CULT: ABNORMAL

## 2025-03-26 ENCOUNTER — OFFICE VISIT (OUTPATIENT)
Dept: FAMILY MEDICINE CLINIC | Facility: HOSPITAL | Age: 60
End: 2025-03-26
Payer: COMMERCIAL

## 2025-03-26 ENCOUNTER — APPOINTMENT (OUTPATIENT)
Dept: LAB | Facility: HOSPITAL | Age: 60
End: 2025-03-26
Payer: COMMERCIAL

## 2025-03-26 VITALS
WEIGHT: 178 LBS | SYSTOLIC BLOOD PRESSURE: 114 MMHG | BODY MASS INDEX: 31.54 KG/M2 | OXYGEN SATURATION: 98 % | HEART RATE: 80 BPM | DIASTOLIC BLOOD PRESSURE: 78 MMHG | HEIGHT: 63 IN

## 2025-03-26 DIAGNOSIS — R73.01 IFG (IMPAIRED FASTING GLUCOSE): ICD-10-CM

## 2025-03-26 DIAGNOSIS — E53.8 B12 DEFICIENCY: ICD-10-CM

## 2025-03-26 DIAGNOSIS — Z78.0 POSTMENOPAUSAL STATE: ICD-10-CM

## 2025-03-26 DIAGNOSIS — M79.2 NERVE PAIN: ICD-10-CM

## 2025-03-26 DIAGNOSIS — Z00.00 MEDICARE ANNUAL WELLNESS VISIT, SUBSEQUENT: Primary | ICD-10-CM

## 2025-03-26 DIAGNOSIS — Z13.220 SCREENING FOR HYPERLIPIDEMIA: ICD-10-CM

## 2025-03-26 LAB
ANION GAP SERPL CALCULATED.3IONS-SCNC: 6 MMOL/L (ref 4–13)
BUN SERPL-MCNC: 13 MG/DL (ref 5–25)
CALCIUM SERPL-MCNC: 8.9 MG/DL (ref 8.4–10.2)
CHLORIDE SERPL-SCNC: 104 MMOL/L (ref 96–108)
CHOLEST SERPL-MCNC: 144 MG/DL (ref ?–200)
CO2 SERPL-SCNC: 31 MMOL/L (ref 21–32)
CREAT SERPL-MCNC: 0.88 MG/DL (ref 0.6–1.3)
EST. AVERAGE GLUCOSE BLD GHB EST-MCNC: 126 MG/DL
GFR SERPL CREATININE-BSD FRML MDRD: 71 ML/MIN/1.73SQ M
GLUCOSE P FAST SERPL-MCNC: 106 MG/DL (ref 65–99)
HBA1C MFR BLD: 6 %
HDLC SERPL-MCNC: 45 MG/DL
LDLC SERPL CALC-MCNC: 60 MG/DL (ref 0–100)
POTASSIUM SERPL-SCNC: 4.4 MMOL/L (ref 3.5–5.3)
SODIUM SERPL-SCNC: 141 MMOL/L (ref 135–147)
TRIGL SERPL-MCNC: 193 MG/DL (ref ?–150)
VIT B12 SERPL-MCNC: 270 PG/ML (ref 180–914)

## 2025-03-26 PROCEDURE — G0439 PPPS, SUBSEQ VISIT: HCPCS | Performed by: STUDENT IN AN ORGANIZED HEALTH CARE EDUCATION/TRAINING PROGRAM

## 2025-03-26 PROCEDURE — 80061 LIPID PANEL: CPT

## 2025-03-26 PROCEDURE — G0444 DEPRESSION SCREEN ANNUAL: HCPCS | Performed by: STUDENT IN AN ORGANIZED HEALTH CARE EDUCATION/TRAINING PROGRAM

## 2025-03-26 PROCEDURE — 80048 BASIC METABOLIC PNL TOTAL CA: CPT

## 2025-03-26 PROCEDURE — 82607 VITAMIN B-12: CPT

## 2025-03-26 PROCEDURE — 83036 HEMOGLOBIN GLYCOSYLATED A1C: CPT

## 2025-03-26 PROCEDURE — 36415 COLL VENOUS BLD VENIPUNCTURE: CPT

## 2025-03-26 RX ORDER — CYANOCOBALAMIN 1000 UG/ML
1000 INJECTION, SOLUTION INTRAMUSCULAR; SUBCUTANEOUS ONCE
Status: COMPLETED | OUTPATIENT
Start: 2025-03-26 | End: 2025-03-26

## 2025-03-26 RX ADMIN — CYANOCOBALAMIN 1000 MCG: 1000 INJECTION, SOLUTION INTRAMUSCULAR; SUBCUTANEOUS at 18:25

## 2025-03-26 NOTE — PROGRESS NOTES
Name: Emelyn Domingo      : 1965      MRN: [id number]  Encounter Provider: Tootie Garcia DO  Encounter Date: 3/26/2025   Encounter department: [facility] PRIMARY C[facility] [address]    Assessment & Plan  Medicare annual wellness visit, subsequent  Topics as below.        B12 deficiency  Dose given in office then can start otc.   Orders:  •  cyanocobalamin injection 1,000 mcg    Postmenopausal state  Due for DXA, told yrs ago was borderline in hip.   Orders:  •  DXA bone density spine hip and pelvis; Future    Return in about 1 year (around 3/26/2026) for Annual Medicare Wellness.     Preventive health issues were discussed with patient, and age appropriate screening tests were ordered as noted in patient's After Visit Summary. Personalized health advice and appropriate referrals for health education or preventive services given if needed, as noted in patient's After Visit Summary.    History of Present Illness     HPI     UTI resolved.     Labs done this morning.   A1c 6.0  Does drink water all day, but eGFR lower since recent abx.   TG's higher, but had pasta the night prior to fasting.   Also admitted to more ice cream & sweets as of late.   Addressing it now by cutting out more.     Wt Readings from Last 3 Encounters:   25 80.7 kg (178 lb)   25 80.7 kg (178 lb)   12/10/24 80.7 kg (178 lb)     Temp Readings from Last 3 Encounters:   12/10/24 97.7 °F (36.5 °C)   24 98.7 °F (37.1 °C)   23 98.2 °F (36.8 °C)     BP Readings from Last 3 Encounters:   25 114/78   25 110/84   12/10/24 110/76     Pulse Readings from Last 3 Encounters:   25 80   25 78   12/10/24 88     Patient Care Team:  Tootie Garcia DO as PCP - General (Family Medicine)  Oscar Villavicencio DO (Anesthesiology)    Review of Systems   Constitutional:  Negative for chills and fever.   Respiratory:  Negative for cough and shortness of breath.    Cardiovascular:  Negative for chest pain and  palpitations.   Genitourinary:  Negative for dysuria, flank pain and frequency.   Neurological:  Negative for dizziness, light-headedness and headaches.     Medical History Reviewed by provider this encounter:  Tobacco  Allergies  Meds  Problems  Med Hx  Surg Hx  Fam Hx       Annual Wellness Visit Questionnaire   Emelyn is here for her Subsequent Wellness visit.     Health Risk Assessment:   Patient rates overall health as good. Patient feels that their physical health rating is same. Patient is satisfied with their life. Eyesight was rated as slightly worse. Hearing was rated as same. Patient feels that their emotional and mental health rating is same. Patients states they are never, rarely angry. Patient states they are never, rarely unusually tired/fatigued. Pain experienced in the last 7 days has been some. Patient's pain rating has been 6/10. Patient states that she has experienced no weight loss or gain in last 6 months.     Depression Screening:   PHQ-2 Score: 0      Fall Risk Screening:   In the past year, patient has experienced: history of falling in past year    Number of falls: 2 or more  Injured during fall?: No    Feels unsteady when standing or walking?: No    Worried about falling?: No      Urinary Incontinence Screening:   Patient has leaked urine accidently in the last six months.     Home Safety:  Patient does not have trouble with stairs inside or outside of their home. Patient has working smoke alarms and has working carbon monoxide detector. Home safety hazards include: none.     Nutrition:   Current diet is Regular.     Medications:   Patient is currently taking over-the-counter supplements. OTC medications include: see medication list. Patient is able to manage medications.     Activities of Daily Living (ADLs)/Instrumental Activities of Daily Living (IADLs):   Walk and transfer into and out of bed and chair?: Yes  Dress and groom yourself?: Yes    Bathe or shower yourself?: Yes     Feed yourself? Yes  Do your laundry/housekeeping?: Yes  Manage your money, pay your bills and track your expenses?: Yes  Make your own meals?: Yes    Do your own shopping?: Yes    Previous Hospitalizations:   Any hospitalizations or ED visits within the last 12 months?: No      Cognitive Screening:   Provider or family/friend/caregiver concerned regarding cognition?: No    PREVENTIVE SCREENINGS      Cardiovascular Screening:    General: Screening Current      Diabetes Screening:     General: Screening Current      Colorectal Cancer Screening:     General: Patient Declines      Breast Cancer Screening:     General: Risks and Benefits Discussed    Due for: Mammogram        Cervical Cancer Screening:    General: Screening Current      Osteoporosis Screening:    General: Risks and Benefits Discussed    Due for: DXA Axial      Lung Cancer Screening:     General: Screening Not Indicated      Hepatitis C Screening:    General: Screening Current      Preventive Screening Comments: Can't do mammos, will do US     Screening, Brief Intervention, and Referral to Treatment (SBIRT)     Screening      AUDIT-C Screenin) How often did you have a drink containing alcohol in the past year? never  2) How many drinks did you have on a typical day when you were drinking in the past year? 0  3) How often did you have 6 or more drinks on one occasion in the past year? never    AUDIT-C Score: 0  Interpretation: Score 0-2 (female): Negative screen for alcohol misuse    Single Item Drug Screening:  How often have you used an illegal drug (including marijuana) or a prescription medication for non-medical reasons in the past year? never    Single Item Drug Screen Score: 0  Interpretation: Negative screen for possible drug use disorder    Annual Depression Screening  Time spent screening and evaluating the patient for depression during today's encounter was 5 minutes.    Review of Current Opioid Use    Opioid Risk Tool (ORT)  "Interpretation: Complete Opioid Risk Tool (ORT)    [place] PDMP or [place]  reviewed. No red flags were identified    Other Counseling Topics:   Car/seat belt/driving safety, skin self-exam, sunscreen and calcium and vitamin D intake and regular weightbearing exercise.     Social Drivers of Health     Food Insecurity: No Food Insecurity (3/26/2025)    Hunger Vital Sign    • Worried About Running Out of Food in the Last Year: Never true    • Ran Out of Food in the Last Year: Never true   Transportation Needs: No Transportation Needs (3/26/2025)    PRAPARE - Transportation    • Lack of Transportation (Medical): No    • Lack of Transportation (Non-Medical): No   Housing Stability: Low Risk  (3/26/2025)    Housing Stability Vital Sign    • Unable to Pay for Housing in the Last Year: No    • Number of Times Moved in the Last Year: 1    • Homeless in the Last Year: No   Utilities: Not At Risk (3/26/2025)    [facility] Utilities    • Threatened with loss of utilities: No     Objective   /78   Pulse 80   Ht 5' 3\" (1.6 m)   Wt 80.7 kg (178 lb)   SpO2 98%   BMI 31.53 kg/m²     Physical Exam  Vitals and nursing note reviewed.   Constitutional:       General: She is not in acute distress.     Appearance: Normal appearance. She is well-developed. She is not ill-appearing.   HENT:      Head: Normocephalic and atraumatic.      Right Ear: Tympanic membrane, ear canal and external ear normal. There is no impacted cerumen.      Left Ear: Tympanic membrane, ear canal and external ear normal. There is no impacted cerumen.      Nose: Nose normal. No congestion or rhinorrhea.      Mouth/Throat:      Mouth: Mucous membranes are moist.      Pharynx: Oropharynx is clear. No oropharyngeal exudate or posterior oropharyngeal erythema.   Eyes:      General: No scleral icterus.        Right eye: No discharge.         Left eye: No discharge.      Conjunctiva/sclera: Conjunctivae normal.   Neck:      Comments: No thyroid nodules or " thyromegaly.  Cardiovascular:      Rate and Rhythm: Normal rate and regular rhythm.      Pulses: Normal pulses.      Heart sounds: Normal heart sounds. No murmur heard.  Pulmonary:      Effort: Pulmonary effort is normal. No respiratory distress.      Breath sounds: Normal breath sounds. No wheezing.   Abdominal:      Tenderness: There is no right CVA tenderness or left CVA tenderness.   Musculoskeletal:         General: Normal range of motion.      Cervical back: Normal range of motion and neck supple. No rigidity or tenderness.      Right lower leg: No edema.      Left lower leg: No edema.   Lymphadenopathy:      Cervical: No cervical adenopathy.   Skin:     General: Skin is warm and dry.      Capillary Refill: Capillary refill takes less than 2 seconds.      Findings: No erythema or rash.   Neurological:      Mental Status: She is alert and oriented to person, place, and time.      Gait: Gait normal.   Psychiatric:         Mood and Affect: Mood normal.         Behavior: Behavior normal.         Thought Content: Thought content normal.         Judgment: Judgment normal.

## 2025-03-26 NOTE — PATIENT INSTRUCTIONS
B12 otc 1000 mcg per day   Vit D3 -1000 to 5000 IU per day   Calcium 400-1200 mg per day     Medicare Preventive Visit Patient Instructions  Thank you for completing your Welcome to Medicare Visit or Medicare Annual Wellness Visit today. Your next wellness visit will be due in one year (3/27/2026).  The screening/preventive services that you may require over the next 5-10 years are detailed below. Some tests may not apply to you based off risk factors and/or age. Screening tests ordered at today's visit but not completed yet may show as past due. Also, please note that scanned in results may not display below.  Preventive Screenings:  Service Recommendations Previous Testing/Comments   Colorectal Cancer Screening  * Colonoscopy    * Fecal Occult Blood Test (FOBT)/Fecal Immunochemical Test (FIT)  * Fecal DNA/Cologuard Test  * Flexible Sigmoidoscopy Age: 45-75 years old   Colonoscopy: every 10 years (may be performed more frequently if at higher risk)  OR  FOBT/FIT: every 1 year  OR  Cologuard: every 3 years  OR  Sigmoidoscopy: every 5 years  Screening may be recommended earlier than age 45 if at higher risk for colorectal cancer. Also, an individualized decision between you and your healthcare provider will decide whether screening between the ages of 76-85 would be appropriate. Colonoscopy: Not on file  FOBT/FIT: Not on file  Cologuard: Not on file  Sigmoidoscopy: Not on file          Breast Cancer Screening Age: 40+ years old  Frequency: every 1-2 years  Not required if history of left and right mastectomy Mammogram: Not on file        Cervical Cancer Screening Between the ages of 21-29, pap smear recommended once every 3 years.   Between the ages of 30-65, can perform pap smear with HPV co-testing every 5 years.   Recommendations may differ for women with a history of total hysterectomy, cervical cancer, or abnormal pap smears in past. Pap Smear: 12/22/2021        Hepatitis C Screening Once for adults born between  1945 and 1965  More frequently in patients at high risk for Hepatitis C Hep C Antibody: 07/29/2024    Screening Current   Diabetes Screening 1-2 times per year if you're at risk for diabetes or have pre-diabetes Fasting glucose: 106 mg/dL (3/26/2025)  A1C: 6.0 % (3/26/2025)  Screening Current   Cholesterol Screening Once every 5 years if you don't have a lipid disorder. May order more often based on risk factors. Lipid panel: 03/26/2025    Screening Current     Other Preventive Screenings Covered by Medicare:  Abdominal Aortic Aneurysm (AAA) Screening: covered once if your at risk. You're considered to be at risk if you have a family history of AAA.  Lung Cancer Screening: covers low dose CT scan once per year if you meet all of the following conditions: (1) Age 55-77; (2) No signs or symptoms of lung cancer; (3) Current smoker or have quit smoking within the last 15 years; (4) You have a tobacco smoking history of at least 20 pack years (packs per day multiplied by number of years you smoked); (5) You get a written order from a healthcare provider.  Glaucoma Screening: covered annually if you're considered high risk: (1) You have diabetes OR (2) Family history of glaucoma OR (3)  aged 50 and older OR (4)  American aged 65 and older  Osteoporosis Screening: covered every 2 years if you meet one of the following conditions: (1) You're estrogen deficient and at risk for osteoporosis based off medical history and other findings; (2) Have a vertebral abnormality; (3) On glucocorticoid therapy for more than 3 months; (4) Have primary hyperparathyroidism; (5) On osteoporosis medications and need to assess response to drug therapy.   Last bone density test (DXA Scan): Not on file.  HIV Screening: covered annually if you're between the age of 15-65. Also covered annually if you are younger than 15 and older than 65 with risk factors for HIV infection. For pregnant patients, it is covered up to 3  times per pregnancy.    Immunizations:  Immunization Recommendations   Influenza Vaccine Annual influenza vaccination during flu season is recommended for all persons aged >= 6 months who do not have contraindications   Pneumococcal Vaccine   * Pneumococcal conjugate vaccine = PCV13 (Prevnar 13), PCV15 (Vaxneuvance), PCV20 (Prevnar 20)  * Pneumococcal polysaccharide vaccine = PPSV23 (Pneumovax) Adults 19-63 yo with certain risk factors or if 65+ yo  If never received any pneumonia vaccine: recommend Prevnar 20 (PCV20)  Give PCV20 if previously received 1 dose of PCV13 or PPSV23   Hepatitis B Vaccine 3 dose series if at intermediate or high risk (ex: diabetes, end stage renal disease, liver disease)   Respiratory syncytial virus (RSV) Vaccine - COVERED BY MEDICARE PART D  * RSVPreF3 (Arexvy) CDC recommends that adults 60 years of age and older may receive a single dose of RSV vaccine using shared clinical decision-making (SCDM)   Tetanus (Td) Vaccine - COST NOT COVERED BY MEDICARE PART B Following completion of primary series, a booster dose should be given every 10 years to maintain immunity against tetanus. Td may also be given as tetanus wound prophylaxis.   Tdap Vaccine - COST NOT COVERED BY MEDICARE PART B Recommended at least once for all adults. For pregnant patients, recommended with each pregnancy.   Shingles Vaccine (Shingrix) - COST NOT COVERED BY MEDICARE PART B  2 shot series recommended in those 19 years and older who have or will have weakened immune systems or those 50 years and older     Health Maintenance Due:      Topic Date Due    HIV Screening  Never done    Colorectal Cancer Screening  03/26/2026 (Originally 2/3/2009)    Breast Cancer Screening: Mammogram  03/26/2026 (Originally 2/3/2004)    Cervical Cancer Screening  12/22/2026    Hepatitis C Screening  Completed     Immunizations Due:      Topic Date Due    Influenza Vaccine (1) 09/01/2024    COVID-19 Vaccine (5 - 2024-25 season) 09/01/2024      Advance Directives   What are advance directives?  Advance directives are legal documents that state your wishes and plans for medical care. These plans are made ahead of time in case you lose your ability to make decisions for yourself. Advance directives can apply to any medical decision, such as the treatments you want, and if you want to donate organs.   What are the types of advance directives?  There are many types of advance directives, and each state has rules about how to use them. You may choose a combination of any of the following:  Living will:  This is a written record of the treatment you want. You can also choose which treatments you do not want, which to limit, and which to stop at a certain time. This includes surgery, medicine, IV fluid, and tube feedings.   Durable power of  for healthcare (DPAHC):  This is a written record that states who you want to make healthcare choices for you when you are unable to make them for yourself. This person, called a proxy, is usually a family member or a friend. You may choose more than 1 proxy.  Do not resuscitate (DNR) order:  A DNR order is used in case your heart stops beating or you stop breathing. It is a request not to have certain forms of treatment, such as CPR. A DNR order may be included in other types of advance directives.  Medical directive:  This covers the care that you want if you are in a coma, near death, or unable to make decisions for yourself. You can list the treatments you want for each condition. Treatment may include pain medicine, surgery, blood transfusions, dialysis, IV or tube feedings, and a ventilator (breathing machine).  Values history:  This document has questions about your views, beliefs, and how you feel and think about life. This information can help others choose the care that you would choose.  Why are advance directives important?  An advance directive helps you control your care. Although spoken wishes may  be used, it is better to have your wishes written down. Spoken wishes can be misunderstood, or not followed. Treatments may be given even if you do not want them. An advance directive may make it easier for your family to make difficult choices about your care.   Urinary Incontinence   Urinary incontinence (UI)  is when you lose control of your bladder. UI develops because your bladder cannot store or empty urine properly. The 3 most common types of UI are stress incontinence, urge incontinence, or both.  Medicines:   May be given to help strengthen your bladder control. Report any side effects of medication to your healthcare provider.  Do pelvic muscle exercises often:  Your pelvic muscles help you stop urinating. Squeeze these muscles tight for 5 seconds, then relax for 5 seconds. Gradually work up to squeezing for 10 seconds. Do 3 sets of 15 repetitions a day, or as directed. This will help strengthen your pelvic muscles and improve bladder control.  Train your bladder:  Go to the bathroom at set times, such as every 2 hours, even if you do not feel the urge to go. You can also try to hold your urine when you feel the urge to go. For example, hold your urine for 5 minutes when you feel the urge to go. As that becomes easier, hold your urine for 10 minutes.   Self-care:   Keep a UI record.  Write down how often you leak urine and how much you leak. Make a note of what you were doing when you leaked urine.  Drink liquids as directed. You may need to limit the amount of liquid you drink to help control your urine leakage. Do not drink any liquid right before you go to bed. Limit or do not have drinks that contain caffeine or alcohol.   Prevent constipation.  Eat a variety of high-fiber foods. Good examples are high-fiber cereals, beans, vegetables, and whole-grain breads. Walking is the best way to trigger your intestines to have a bowel movement.  Exercise regularly and maintain a healthy weight.  Weight loss and  exercise will decrease pressure on your bladder and help you control your leakage.   Use a catheter as directed  to help empty your bladder. A catheter is a tiny, plastic tube that is put into your bladder to drain your urine.   Go to behavior therapy as directed.  Behavior therapy may be used to help you learn to control your urge to urinate.    Weight Management   Why it is important to manage your weight:  Being overweight increases your risk of health conditions such as heart disease, high blood pressure, type 2 diabetes, and certain types of cancer. It can also increase your risk for osteoarthritis, sleep apnea, and other respiratory problems. Aim for a slow, steady weight loss. Even a small amount of weight loss can lower your risk of health problems.  How to lose weight safely:  A safe and healthy way to lose weight is to eat fewer calories and get regular exercise. You can lose up about 1 pound a week by decreasing the number of calories you eat by 500 calories each day.   Healthy meal plan for weight management:  A healthy meal plan includes a variety of foods, contains fewer calories, and helps you stay healthy. A healthy meal plan includes the following:  Eat whole-grain foods more often.  A healthy meal plan should contain fiber. Fiber is the part of grains, fruits, and vegetables that is not broken down by your body. Whole-grain foods are healthy and provide extra fiber in your diet. Some examples of whole-grain foods are whole-wheat breads and pastas, oatmeal, brown rice, and bulgur.  Eat a variety of vegetables every day.  Include dark, leafy greens such as spinach, kale, ayush greens, and mustard greens. Eat yellow and orange vegetables such as carrots, sweet potatoes, and winter squash.   Eat a variety of fruits every day.  Choose fresh or canned fruit (canned in its own juice or light syrup) instead of juice. Fruit juice has very little or no fiber.  Eat low-fat dairy foods.  Drink fat-free  (skim) milk or 1% milk. Eat fat-free yogurt and low-fat cottage cheese. Try low-fat cheeses such as mozzarella and other reduced-fat cheeses.  Choose meat and other protein foods that are low in fat.  Choose beans or other legumes such as split peas or lentils. Choose fish, skinless poultry (chicken or turkey), or lean cuts of red meat (beef or pork). Before you cook meat or poultry, cut off any visible fat.   Use less fat and oil.  Try baking foods instead of frying them. Add less fat, such as margarine, sour cream, regular salad dressing and mayonnaise to foods. Eat fewer high-fat foods. Some examples of high-fat foods include french fries, doughnuts, ice cream, and cakes.  Eat fewer sweets.  Limit foods and drinks that are high in sugar. This includes candy, cookies, regular soda, and sweetened drinks.  Exercise:  Exercise at least 30 minutes per day on most days of the week. Some examples of exercise include walking, biking, dancing, and swimming. You can also fit in more physical activity by taking the stairs instead of the elevator or parking farther away from stores. Ask your healthcare provider about the best exercise plan for you.   Narcotic (Opioid) Safety    Use narcotics safely:  Take prescribed narcotics exactly as directed  Do not give narcotics to others or take narcotics that belong to someone else  Do not mix narcotics without medicines or alcohol  Do not drive or operate heavy machinery after you take the narcotic  Monitor for side effects and notify your healthcare provider if you experienced side effects such as nausea, sleepiness, itching, or trouble thinking clearly.    Manage constipation:    Constipation is the most common side effect of narcotic medicine. Constipation is when you have hard, dry bowel movements, or you go longer than usual between bowel movements. Tell your healthcare provider about all changes in your bowel movements while you are taking narcotics. He or she may recommend  laxative medicine to help you have a bowel movement. He or she may also change the kind of narcotic you are taking, or change when you take it. The following are more ways you can prevent or relieve constipation:    Drink liquids as directed.  You may need to drink extra liquids to help soften and move your bowels. Ask how much liquid to drink each day and which liquids are best for you.  Eat high-fiber foods.  This may help decrease constipation by adding bulk to your bowel movements. High-fiber foods include fruits, vegetables, whole-grain breads and cereals, and beans. Your healthcare provider or dietitian can help you create a high-fiber meal plan. Your provider may also recommend a fiber supplement if you cannot get enough fiber from food.  Exercise regularly.  Regular physical activity can help stimulate your intestines. Walking is a good exercise to prevent or relieve constipation. Ask which exercises are best for you.  Schedule a time each day to have a bowel movement.  This may help train your body to have regular bowel movements. Bend forward while you are on the toilet to help move the bowel movement out. Sit on the toilet for at least 10 minutes, even if you do not have a bowel movement.    Store narcotics safely:   Store narcotics where others cannot easily get them.  Keep them in a locked cabinet or secure area. Do not  keep them in a purse or other bag you carry with you. A person may be looking for something else and find the narcotics.  Make sure narcotics are stored out of the reach of children.  A child can easily overdose on narcotics. Narcotics may look like candy to a small child.    The best way to dispose of narcotics:      The laws vary by country and area. In the [place], the best way is to return the narcotics through a take-back program. This program is offered by the [place] Drug Enforcement Agency ([place]). The following are options for using the program:  Take the narcotics to a [place]  collection site.  The site is often a law enforcement center. Call your local law enforcement center for scheduled take-back days in your area. You will be given information on where to go if the collection site is in a different location.  Take the narcotics to an approved pharmacy or hospital.  A pharmacy or hospital may be set up as a collection site. You will need to ask if it is a [place] collection site if you were not directed there. A pharmacy or doctor's office may not be able to take back narcotics unless it is a [place] site.  Use a mail-back system.  This means you are given containers to put the narcotics into. You will then mail them in the containers.  Use a take-back drop box.  This is a place to leave the narcotics at any time. People and animals will not be able to get into the box. Your local law enforcement agency can tell you where to find a drop box in your area.    Other ways to manage pain:   Ask your healthcare provider about non-narcotic medicines to control pain.  Nonprescription medicines include NSAIDs (such as ibuprofen) and acetaminophen. Prescription medicines include muscle relaxers, antidepressants, and steroids.  Pain may be managed without any medicines.  Some ways to relieve pain include massage, aromatherapy, or meditation. Physical or occupational therapy may also help.    For more information:   Drug Enforcement Administration  [address] [place] [address]  Phone: 6- 835 - 389-3736  Web Address: https://www.deadiversion.[facility].gov/drug_disposal/    [place] Food and Drug Administration  [address] [place] [address]  Phone: 2- 506 - 825-1603  Web Address: http://www.fda.gov     © Copyright [employer] 2018 Information is for End User's use only and may not be sold, redistributed or otherwise used for commercial purposes. All illustrations and images included in CareNotes® are the copyrighted property of A.D.A.M., Inc. or [employer]

## 2025-04-03 ENCOUNTER — HOSPITAL ENCOUNTER (OUTPATIENT)
Dept: BONE DENSITY | Facility: IMAGING CENTER | Age: 60
Discharge: HOME | End: 2025-04-03
Payer: COMMERCIAL

## 2025-04-03 VITALS — WEIGHT: 178.6 LBS | BODY MASS INDEX: 32.87 KG/M2 | HEIGHT: 62 IN

## 2025-04-03 DIAGNOSIS — Z78.0 POSTMENOPAUSAL STATE: ICD-10-CM

## 2025-04-03 PROCEDURE — 77080 DXA BONE DENSITY AXIAL: CPT

## 2025-05-15 ENCOUNTER — RESULTS FOLLOW-UP (OUTPATIENT)
Dept: FAMILY MEDICINE CLINIC | Facility: HOSPITAL | Age: 60
End: 2025-05-15

## 2025-05-15 DIAGNOSIS — M85.852 OSTEOPENIA OF LEFT HIP: Primary | ICD-10-CM

## 2025-05-15 RX ORDER — B-COMPLEX WITH VITAMIN C
2 TABLET ORAL 2 TIMES DAILY WITH MEALS
Qty: 400 TABLET | Refills: 1 | Status: SHIPPED | OUTPATIENT
Start: 2025-05-15 | End: 2025-05-15 | Stop reason: SDUPTHER

## 2025-05-15 RX ORDER — B-COMPLEX WITH VITAMIN C
1 TABLET ORAL 2 TIMES DAILY WITH MEALS
Qty: 200 TABLET | Refills: 1 | Status: SHIPPED | OUTPATIENT
Start: 2025-05-15

## 2025-05-16 ENCOUNTER — TELEPHONE (OUTPATIENT)
Age: 60
End: 2025-05-16

## 2025-05-16 NOTE — TELEPHONE ENCOUNTER
Spoke with [facility] pharmacy  and was able to verify per order that the provider had ordered Calcium carbonate-elizabeth D yesterday but had canceled and placed the order with direction to Take 1 tablet by mouth 2 (two) times a day with meals.

## 2025-05-16 NOTE — TELEPHONE ENCOUNTER
[facility] called requesting clarification on a prescription that they received for Emelyn. Stated that they received 2 different directions for the oyster shell calcium, one had directions of 1 twice a day and the other directions were 2 twice a day.    Please reach out to provide clarification.      [facility]   [phone]

## 2025-07-17 ENCOUNTER — OFFICE VISIT (OUTPATIENT)
Dept: FAMILY MEDICINE CLINIC | Facility: HOSPITAL | Age: 60
End: 2025-07-17
Payer: COMMERCIAL

## 2025-07-17 VITALS
DIASTOLIC BLOOD PRESSURE: 78 MMHG | WEIGHT: 182 LBS | SYSTOLIC BLOOD PRESSURE: 118 MMHG | HEART RATE: 79 BPM | BODY MASS INDEX: 33.49 KG/M2 | HEIGHT: 62 IN | OXYGEN SATURATION: 97 %

## 2025-07-17 DIAGNOSIS — N39.46 MIXED INCONTINENCE URGE AND STRESS: ICD-10-CM

## 2025-07-17 DIAGNOSIS — N81.10 BLADDER PROLAPSE, FEMALE, ACQUIRED: ICD-10-CM

## 2025-07-17 DIAGNOSIS — R35.0 URINARY FREQUENCY: Primary | ICD-10-CM

## 2025-07-17 LAB
SL AMB  POCT GLUCOSE, UA: ABNORMAL
SL AMB LEUKOCYTE ESTERASE,UA: ABNORMAL
SL AMB POCT BILIRUBIN,UA: ABNORMAL
SL AMB POCT BLOOD,UA: ABNORMAL
SL AMB POCT CLARITY,UA: CLEAR
SL AMB POCT COLOR,UA: ABNORMAL
SL AMB POCT KETONES,UA: ABNORMAL
SL AMB POCT NITRITE,UA: ABNORMAL
SL AMB POCT PH,UA: 8
SL AMB POCT SPECIFIC GRAVITY,UA: 1
SL AMB POCT URINE PROTEIN: ABNORMAL
SL AMB POCT UROBILINOGEN: ABNORMAL

## 2025-07-17 PROCEDURE — 81003 URINALYSIS AUTO W/O SCOPE: CPT | Performed by: INTERNAL MEDICINE

## 2025-07-17 PROCEDURE — 87186 SC STD MICRODIL/AGAR DIL: CPT | Performed by: INTERNAL MEDICINE

## 2025-07-17 PROCEDURE — G2211 COMPLEX E/M VISIT ADD ON: HCPCS | Performed by: INTERNAL MEDICINE

## 2025-07-17 PROCEDURE — 99214 OFFICE O/P EST MOD 30 MIN: CPT | Performed by: INTERNAL MEDICINE

## 2025-07-17 PROCEDURE — 87077 CULTURE AEROBIC IDENTIFY: CPT | Performed by: INTERNAL MEDICINE

## 2025-07-17 PROCEDURE — 87086 URINE CULTURE/COLONY COUNT: CPT | Performed by: INTERNAL MEDICINE

## 2025-07-17 RX ORDER — CEPHALEXIN 500 MG/1
500 CAPSULE ORAL 3 TIMES DAILY
Qty: 21 CAPSULE | Refills: 0 | Status: SHIPPED | OUTPATIENT
Start: 2025-07-17 | End: 2025-07-24

## 2025-07-17 NOTE — PROGRESS NOTES
Assessment/Plan:     Diagnosis ICD-10-CM Associated Orders   1. Urinary frequency  R35.0 POCT urine dip auto non-scope     Urine culture     cephalexin (KEFLEX) 500 mg capsule     US kidney and bladder with pvr      2. Mixed incontinence urge and stress  N39.46 US kidney and bladder with pvr      3. Bladder prolapse, female, acquired  N81.10 US kidney and bladder with pvr          Problem List Items Addressed This Visit          Other    Mixed incontinence urge and stress    Relevant Orders    US kidney and bladder with pvr     Other Visit Diagnoses         Urinary frequency    -  Primary    Relevant Medications    cephalexin (KEFLEX) 500 mg capsule    Other Relevant Orders    POCT urine dip auto non-scope (Completed)    Urine culture    US kidney and bladder with pvr      Bladder prolapse, female, acquired        Relevant Orders    US kidney and bladder with pvr              No follow-ups on file.      Subjective:    Patient ID: Emelyn Domingo is a 60 y.o. female    Started a week ago with increased frequency.   Had uti in march with ecoli- was resistant to bactrim- was on cephalexin    Urinary Frequency   This is a new problem. The current episode started in the past 7 days. The pain is mild (pressure in  bladder region). There has been no fever. The fever has been present for Less than 1 day. She is Not sexually active. Associated symptoms include flank pain, frequency and urgency. Pertinent negatives include no chills.   Difficulty Urinating   This is a recurrent problem. The current episode started in the past 7 days. The problem occurs intermittently. The problem has been gradually worsening. The quality of the pain is described as burning. The pain is at a severity of 4/10. The pain is mild. There has been no fever. She is Not sexually active. There is No history of pyelonephritis. Associated symptoms include flank pain, frequency and urgency. Pertinent negatives include no chills.       The following portions  "of the patient's history were reviewed and updated as appropriate: allergies, current medications and problem list.     Review of Systems   Constitutional:  Negative for chills and fever.   Respiratory:  Negative for cough.    Genitourinary:  Positive for dysuria, flank pain, frequency and urgency.   All other systems reviewed and are negative.        Objective:    Current Medications[1]    Blood pressure 118/78, pulse 79, height 5' 1.8\" (1.57 m), weight 82.6 kg (182 lb), SpO2 97%.     Physical Exam  Vitals and nursing note reviewed.   Constitutional:       General: She is not in acute distress.  HENT:      Head: Normocephalic.      Right Ear: Tympanic membrane normal. There is no impacted cerumen.      Left Ear: Tympanic membrane normal. There is no impacted cerumen.      Nose: No congestion.      Mouth/Throat:      Pharynx: No posterior oropharyngeal erythema.     Eyes:      General: No scleral icterus.      Cardiovascular:      Rate and Rhythm: Normal rate and regular rhythm.      Heart sounds: No murmur heard.  Pulmonary:      Breath sounds: No rhonchi.   Abdominal:      Palpations: Abdomen is soft.      Tenderness: There is abdominal tenderness.      Comments: Mild suprapubic     Musculoskeletal:      Cervical back: No rigidity or tenderness.      Right lower leg: No edema.      Left lower leg: No edema.     Skin:     Findings: No bruising.     Neurological:      Mental Status: She is alert.     Psychiatric:         Mood and Affect: Mood normal.         Thought Content: Thought content normal.         Judgment: Judgment normal.             [1]   Current Outpatient Medications:     calcium carbonate-vitamin D 500 mg-5 mcg per tablet, Take 1 tablet by mouth 2 (two) times a day with meals, Disp: 200 tablet, Rfl: 1    cephalexin (KEFLEX) 500 mg capsule, Take 1 capsule (500 mg total) by mouth 3 (three) times a day for 7 days, Disp: 21 capsule, Rfl: 0    morphine (MS CONTIN) 60 mg 12 hr tablet, Take 60 mg by mouth in " the morning and 60 mg in the evening and 60 mg before bedtime., Disp: , Rfl:     ondansetron (ZOFRAN-ODT) 8 mg disintegrating tablet, 1 every 8 hours as needed, Disp: , Rfl:     oxyCODONE (ROXICODONE) 15 mg immediate release tablet, Take 15 mg by mouth every 6 (six) hours as needed for severe pain, Disp: , Rfl:

## 2025-07-20 LAB — BACTERIA UR CULT: ABNORMAL

## 2025-08-01 ENCOUNTER — TELEPHONE (OUTPATIENT)
Age: 60
End: 2025-08-01

## 2025-08-12 ENCOUNTER — APPOINTMENT (OUTPATIENT)
Dept: LAB | Facility: HOSPITAL | Age: 60
End: 2025-08-12
Payer: COMMERCIAL